# Patient Record
Sex: MALE | Race: BLACK OR AFRICAN AMERICAN | NOT HISPANIC OR LATINO | URBAN - METROPOLITAN AREA
[De-identification: names, ages, dates, MRNs, and addresses within clinical notes are randomized per-mention and may not be internally consistent; named-entity substitution may affect disease eponyms.]

---

## 2019-01-01 ENCOUNTER — HOSPITAL ENCOUNTER (INPATIENT)
Facility: OTHER | Age: 64
LOS: 4 days | DRG: 314 | End: 2019-08-01
Attending: HOSPITALIST | Admitting: HOSPITALIST
Payer: COMMERCIAL

## 2019-01-01 VITALS
TEMPERATURE: 94 F | WEIGHT: 253.5 LBS | BODY MASS INDEX: 33.6 KG/M2 | DIASTOLIC BLOOD PRESSURE: 34 MMHG | SYSTOLIC BLOOD PRESSURE: 72 MMHG | HEIGHT: 73 IN | OXYGEN SATURATION: 100 %

## 2019-01-01 DIAGNOSIS — D65: ICD-10-CM

## 2019-01-01 DIAGNOSIS — A41.9 SEPSIS: ICD-10-CM

## 2019-01-01 DIAGNOSIS — G93.1 ANOXIC ENCEPHALOPATHY: ICD-10-CM

## 2019-01-01 DIAGNOSIS — R57.9 SHOCK: ICD-10-CM

## 2019-01-01 DIAGNOSIS — J96.21 ACUTE ON CHRONIC RESPIRATORY FAILURE WITH HYPOXIA: ICD-10-CM

## 2019-01-01 DIAGNOSIS — N18.6 ESRD (END STAGE RENAL DISEASE): ICD-10-CM

## 2019-01-01 DIAGNOSIS — R57.9 SHOCK, UNSPECIFIED: ICD-10-CM

## 2019-01-01 DIAGNOSIS — R07.9 CHEST PAIN: ICD-10-CM

## 2019-01-01 DIAGNOSIS — I46.9 CARDIAC ARREST: ICD-10-CM

## 2019-01-01 DIAGNOSIS — D69.6 THROMBOCYTOPENIA: ICD-10-CM

## 2019-01-01 DIAGNOSIS — I50.23 ACUTE ON CHRONIC SYSTOLIC CONGESTIVE HEART FAILURE: ICD-10-CM

## 2019-01-01 DIAGNOSIS — R78.81 ENTEROCOCCAL BACTEREMIA: ICD-10-CM

## 2019-01-01 DIAGNOSIS — B95.2 ENTEROCOCCAL BACTEREMIA: ICD-10-CM

## 2019-01-01 DIAGNOSIS — A41.9 SEPSIS, DUE TO UNSPECIFIED ORGANISM: ICD-10-CM

## 2019-01-01 DIAGNOSIS — E44.1 MALNUTRITION OF MILD DEGREE: ICD-10-CM

## 2019-01-01 LAB
ABO + RH BLD: NORMAL
ACANTHOCYTES BLD QL SMEAR: PRESENT
ALBUMIN FLD-MCNC: 1.8 G/DL
ALBUMIN SERPL BCP-MCNC: 2.1 G/DL (ref 3.5–5.2)
ALBUMIN SERPL BCP-MCNC: 2.5 G/DL (ref 3.5–5.2)
ALBUMIN SERPL BCP-MCNC: 2.7 G/DL (ref 3.5–5.2)
ALBUMIN SERPL BCP-MCNC: 2.7 G/DL (ref 3.5–5.2)
ALBUMIN SERPL BCP-MCNC: 2.8 G/DL (ref 3.5–5.2)
ALBUMIN SERPL BCP-MCNC: 2.9 G/DL (ref 3.5–5.2)
ALBUMIN SERPL BCP-MCNC: 2.9 G/DL (ref 3.5–5.2)
ALBUMIN SERPL BCP-MCNC: 3 G/DL (ref 3.5–5.2)
ALLENS TEST: ABNORMAL
ALP SERPL-CCNC: 134 U/L (ref 55–135)
ALP SERPL-CCNC: 146 U/L (ref 55–135)
ALP SERPL-CCNC: 161 U/L (ref 55–135)
ALP SERPL-CCNC: 177 U/L (ref 55–135)
ALT SERPL W/O P-5'-P-CCNC: 1227 U/L (ref 10–44)
ALT SERPL W/O P-5'-P-CCNC: 1534 U/L (ref 10–44)
ALT SERPL W/O P-5'-P-CCNC: 1790 U/L (ref 10–44)
ALT SERPL W/O P-5'-P-CCNC: 1811 U/L (ref 10–44)
AMMONIA PLAS-SCNC: 141 UMOL/L (ref 10–50)
AMMONIA PLAS-SCNC: 144 UMOL/L (ref 10–50)
AMMONIA PLAS-SCNC: 158 UMOL/L (ref 10–50)
AMMONIA PLAS-SCNC: 167 UMOL/L (ref 10–50)
AMMONIA PLAS-SCNC: 169 UMOL/L (ref 10–50)
AMMONIA PLAS-SCNC: 172 UMOL/L (ref 10–50)
AMMONIA PLAS-SCNC: 174 UMOL/L (ref 10–50)
AMMONIA PLAS-SCNC: 204 UMOL/L (ref 10–50)
ANION GAP SERPL CALC-SCNC: 21 MMOL/L (ref 8–16)
ANION GAP SERPL CALC-SCNC: 22 MMOL/L (ref 8–16)
ANION GAP SERPL CALC-SCNC: 24 MMOL/L (ref 8–16)
ANION GAP SERPL CALC-SCNC: 25 MMOL/L (ref 8–16)
ANION GAP SERPL CALC-SCNC: 26 MMOL/L (ref 8–16)
ANION GAP SERPL CALC-SCNC: 28 MMOL/L (ref 8–16)
ANION GAP SERPL CALC-SCNC: 30 MMOL/L (ref 8–16)
ANION GAP SERPL CALC-SCNC: 31 MMOL/L (ref 8–16)
ANISOCYTOSIS BLD QL SMEAR: ABNORMAL
ANISOCYTOSIS BLD QL SMEAR: SLIGHT
AORTIC ROOT ANNULUS: 3.25 CM
AORTIC VALVE CUSP SEPERATION: 2.05 CM
APPEARANCE FLD: NORMAL
ASCENDING AORTA: 2.98 CM
AST SERPL-CCNC: 2168 U/L (ref 10–40)
AST SERPL-CCNC: 2902 U/L (ref 10–40)
AST SERPL-CCNC: 4122 U/L (ref 10–40)
AST SERPL-CCNC: 4507 U/L (ref 10–40)
AV INDEX (PROSTH): 1.31
AV MEAN GRADIENT: 4 MMHG
AV PEAK GRADIENT: 8 MMHG
AV VALVE AREA: 4.63 CM2
AV VELOCITY RATIO: 0.89
BACTERIA SPEC AEROBE CULT: NO GROWTH
BASOPHILS # BLD AUTO: 0.03 K/UL (ref 0–0.2)
BASOPHILS # BLD AUTO: ABNORMAL K/UL (ref 0–0.2)
BASOPHILS NFR BLD: 0 % (ref 0–1.9)
BASOPHILS NFR BLD: 0.2 % (ref 0–1.9)
BILIRUB SERPL-MCNC: 3.9 MG/DL (ref 0.1–1)
BILIRUB SERPL-MCNC: 5.4 MG/DL (ref 0.1–1)
BILIRUB SERPL-MCNC: 7.3 MG/DL (ref 0.1–1)
BILIRUB SERPL-MCNC: 8.7 MG/DL (ref 0.1–1)
BLD GP AB SCN CELLS X3 SERPL QL: NORMAL
BLD PROD TYP BPU: NORMAL
BLOOD UNIT EXPIRATION DATE: NORMAL
BLOOD UNIT TYPE CODE: 1700
BLOOD UNIT TYPE CODE: 5100
BLOOD UNIT TYPE CODE: 600
BLOOD UNIT TYPE CODE: 6200
BLOOD UNIT TYPE CODE: 6200
BLOOD UNIT TYPE CODE: 7300
BLOOD UNIT TYPE CODE: 8400
BLOOD UNIT TYPE: NORMAL
BNP SERPL-MCNC: >4900 PG/ML (ref 0–99)
BODY FLD TYPE: NORMAL
BODY FLUID SOURCE, LDH: NORMAL
BSA FOR ECHO PROCEDURE: 2.43 M2
BUN SERPL-MCNC: 11 MG/DL (ref 8–23)
BUN SERPL-MCNC: 12 MG/DL (ref 8–23)
BUN SERPL-MCNC: 14 MG/DL (ref 8–23)
BUN SERPL-MCNC: 14 MG/DL (ref 8–23)
BUN SERPL-MCNC: 16 MG/DL (ref 8–23)
BUN SERPL-MCNC: 18 MG/DL (ref 8–23)
BUN SERPL-MCNC: 20 MG/DL (ref 8–23)
BUN SERPL-MCNC: 20 MG/DL (ref 8–23)
BUN SERPL-MCNC: 21 MG/DL (ref 8–23)
BUN SERPL-MCNC: 24 MG/DL (ref 8–23)
BUN SERPL-MCNC: 28 MG/DL (ref 8–23)
BUN SERPL-MCNC: 28 MG/DL (ref 8–23)
BUN SERPL-MCNC: 29 MG/DL (ref 8–23)
BUN SERPL-MCNC: 30 MG/DL (ref 8–23)
BURR CELLS BLD QL SMEAR: ABNORMAL
CA-I BLDV-SCNC: 0.89 MMOL/L (ref 1.06–1.42)
CALCIUM SERPL-MCNC: 7.2 MG/DL (ref 8.7–10.5)
CALCIUM SERPL-MCNC: 7.6 MG/DL (ref 8.7–10.5)
CALCIUM SERPL-MCNC: 7.6 MG/DL (ref 8.7–10.5)
CALCIUM SERPL-MCNC: 7.8 MG/DL (ref 8.7–10.5)
CALCIUM SERPL-MCNC: 7.8 MG/DL (ref 8.7–10.5)
CALCIUM SERPL-MCNC: 7.9 MG/DL (ref 8.7–10.5)
CALCIUM SERPL-MCNC: 8.1 MG/DL (ref 8.7–10.5)
CALCIUM SERPL-MCNC: 8.1 MG/DL (ref 8.7–10.5)
CALCIUM SERPL-MCNC: 8.2 MG/DL (ref 8.7–10.5)
CHLORIDE SERPL-SCNC: 100 MMOL/L (ref 95–110)
CHLORIDE SERPL-SCNC: 100 MMOL/L (ref 95–110)
CHLORIDE SERPL-SCNC: 93 MMOL/L (ref 95–110)
CHLORIDE SERPL-SCNC: 94 MMOL/L (ref 95–110)
CHLORIDE SERPL-SCNC: 95 MMOL/L (ref 95–110)
CHLORIDE SERPL-SCNC: 95 MMOL/L (ref 95–110)
CHLORIDE SERPL-SCNC: 96 MMOL/L (ref 95–110)
CHLORIDE SERPL-SCNC: 97 MMOL/L (ref 95–110)
CHLORIDE SERPL-SCNC: 98 MMOL/L (ref 95–110)
CHLORIDE SERPL-SCNC: 99 MMOL/L (ref 95–110)
CO2 SERPL-SCNC: 11 MMOL/L (ref 23–29)
CO2 SERPL-SCNC: 12 MMOL/L (ref 23–29)
CO2 SERPL-SCNC: 13 MMOL/L (ref 23–29)
CO2 SERPL-SCNC: 14 MMOL/L (ref 23–29)
CO2 SERPL-SCNC: 14 MMOL/L (ref 23–29)
CO2 SERPL-SCNC: 15 MMOL/L (ref 23–29)
CO2 SERPL-SCNC: 17 MMOL/L (ref 23–29)
CO2 SERPL-SCNC: 7 MMOL/L (ref 23–29)
CO2 SERPL-SCNC: 8 MMOL/L (ref 23–29)
CO2 SERPL-SCNC: 9 MMOL/L (ref 23–29)
CO2 SERPL-SCNC: 9 MMOL/L (ref 23–29)
CODING SYSTEM: NORMAL
COLOR FLD: YELLOW
CREAT SERPL-MCNC: 1.5 MG/DL (ref 0.5–1.4)
CREAT SERPL-MCNC: 1.6 MG/DL (ref 0.5–1.4)
CREAT SERPL-MCNC: 1.8 MG/DL (ref 0.5–1.4)
CREAT SERPL-MCNC: 1.8 MG/DL (ref 0.5–1.4)
CREAT SERPL-MCNC: 2 MG/DL (ref 0.5–1.4)
CREAT SERPL-MCNC: 2.2 MG/DL (ref 0.5–1.4)
CREAT SERPL-MCNC: 2.3 MG/DL (ref 0.5–1.4)
CREAT SERPL-MCNC: 2.3 MG/DL (ref 0.5–1.4)
CREAT SERPL-MCNC: 2.5 MG/DL (ref 0.5–1.4)
CREAT SERPL-MCNC: 2.8 MG/DL (ref 0.5–1.4)
CREAT SERPL-MCNC: 3.4 MG/DL (ref 0.5–1.4)
CREAT SERPL-MCNC: 3.4 MG/DL (ref 0.5–1.4)
CREAT SERPL-MCNC: 3.6 MG/DL (ref 0.5–1.4)
CREAT SERPL-MCNC: 3.9 MG/DL (ref 0.5–1.4)
CV ECHO LV RWT: 0.8 CM
D DIMER PPP IA.FEU-MCNC: 33 MG/L FEU
DELSYS: ABNORMAL
DIFFERENTIAL METHOD: ABNORMAL
DISPENSE STATUS: NORMAL
DOP CALC AO PEAK VEL: 1.42 M/S
DOP CALC AO VTI: 12.86 CM
DOP CALC LVOT AREA: 3.5 CM2
DOP CALC LVOT DIAMETER: 2.12 CM
DOP CALC LVOT PEAK VEL: 1.27 M/S
DOP CALC LVOT STROKE VOLUME: 59.55 CM3
DOP CALCLVOT PEAK VEL VTI: 16.88 CM
E WAVE DECELERATION TIME: 155.44 MSEC
E/E' RATIO: 8.7 M/S
ECHO LV POSTERIOR WALL: 1.65 CM (ref 0.6–1.1)
EOSINOPHIL # BLD AUTO: 0.1 K/UL (ref 0–0.5)
EOSINOPHIL # BLD AUTO: ABNORMAL K/UL (ref 0–0.5)
EOSINOPHIL NFR BLD: 0 % (ref 0–8)
EOSINOPHIL NFR BLD: 0.6 % (ref 0–8)
EOSINOPHIL NFR BLD: 1 % (ref 0–8)
EOSINOPHIL NFR BLD: 1 % (ref 0–8)
ERYTHROCYTE [DISTWIDTH] IN BLOOD BY AUTOMATED COUNT: 21.6 % (ref 11.5–14.5)
ERYTHROCYTE [DISTWIDTH] IN BLOOD BY AUTOMATED COUNT: 21.8 % (ref 11.5–14.5)
ERYTHROCYTE [DISTWIDTH] IN BLOOD BY AUTOMATED COUNT: 21.8 % (ref 11.5–14.5)
ERYTHROCYTE [DISTWIDTH] IN BLOOD BY AUTOMATED COUNT: 21.9 % (ref 11.5–14.5)
ERYTHROCYTE [DISTWIDTH] IN BLOOD BY AUTOMATED COUNT: 22 % (ref 11.5–14.5)
ERYTHROCYTE [DISTWIDTH] IN BLOOD BY AUTOMATED COUNT: 22.3 % (ref 11.5–14.5)
ERYTHROCYTE [DISTWIDTH] IN BLOOD BY AUTOMATED COUNT: 22.3 % (ref 11.5–14.5)
ERYTHROCYTE [DISTWIDTH] IN BLOOD BY AUTOMATED COUNT: 22.5 % (ref 11.5–14.5)
ERYTHROCYTE [DISTWIDTH] IN BLOOD BY AUTOMATED COUNT: 22.6 % (ref 11.5–14.5)
ERYTHROCYTE [DISTWIDTH] IN BLOOD BY AUTOMATED COUNT: 22.7 % (ref 11.5–14.5)
ERYTHROCYTE [DISTWIDTH] IN BLOOD BY AUTOMATED COUNT: 22.9 % (ref 11.5–14.5)
ERYTHROCYTE [DISTWIDTH] IN BLOOD BY AUTOMATED COUNT: 22.9 % (ref 11.5–14.5)
ERYTHROCYTE [SEDIMENTATION RATE] IN BLOOD BY WESTERGREN METHOD: 20 MM/H
ERYTHROCYTE [SEDIMENTATION RATE] IN BLOOD BY WESTERGREN METHOD: 26 MM/H
EST. GFR  (AFRICAN AMERICAN): 18 ML/MIN/1.73 M^2
EST. GFR  (AFRICAN AMERICAN): 20 ML/MIN/1.73 M^2
EST. GFR  (AFRICAN AMERICAN): 21 ML/MIN/1.73 M^2
EST. GFR  (AFRICAN AMERICAN): 21 ML/MIN/1.73 M^2
EST. GFR  (AFRICAN AMERICAN): 27 ML/MIN/1.73 M^2
EST. GFR  (AFRICAN AMERICAN): 31 ML/MIN/1.73 M^2
EST. GFR  (AFRICAN AMERICAN): 34 ML/MIN/1.73 M^2
EST. GFR  (AFRICAN AMERICAN): 34 ML/MIN/1.73 M^2
EST. GFR  (AFRICAN AMERICAN): 36 ML/MIN/1.73 M^2
EST. GFR  (AFRICAN AMERICAN): 40 ML/MIN/1.73 M^2
EST. GFR  (AFRICAN AMERICAN): 46 ML/MIN/1.73 M^2
EST. GFR  (AFRICAN AMERICAN): 46 ML/MIN/1.73 M^2
EST. GFR  (AFRICAN AMERICAN): 53 ML/MIN/1.73 M^2
EST. GFR  (AFRICAN AMERICAN): 57 ML/MIN/1.73 M^2
EST. GFR  (NON AFRICAN AMERICAN): 15 ML/MIN/1.73 M^2
EST. GFR  (NON AFRICAN AMERICAN): 17 ML/MIN/1.73 M^2
EST. GFR  (NON AFRICAN AMERICAN): 18 ML/MIN/1.73 M^2
EST. GFR  (NON AFRICAN AMERICAN): 18 ML/MIN/1.73 M^2
EST. GFR  (NON AFRICAN AMERICAN): 23 ML/MIN/1.73 M^2
EST. GFR  (NON AFRICAN AMERICAN): 27 ML/MIN/1.73 M^2
EST. GFR  (NON AFRICAN AMERICAN): 29 ML/MIN/1.73 M^2
EST. GFR  (NON AFRICAN AMERICAN): 29 ML/MIN/1.73 M^2
EST. GFR  (NON AFRICAN AMERICAN): 31 ML/MIN/1.73 M^2
EST. GFR  (NON AFRICAN AMERICAN): 35 ML/MIN/1.73 M^2
EST. GFR  (NON AFRICAN AMERICAN): 39 ML/MIN/1.73 M^2
EST. GFR  (NON AFRICAN AMERICAN): 39 ML/MIN/1.73 M^2
EST. GFR  (NON AFRICAN AMERICAN): 45 ML/MIN/1.73 M^2
EST. GFR  (NON AFRICAN AMERICAN): 49 ML/MIN/1.73 M^2
ESTIMATED AVG GLUCOSE: 103 MG/DL (ref 68–131)
FIBRINOGEN PPP-MCNC: 103 MG/DL (ref 182–366)
FIBRINOGEN PPP-MCNC: 134 MG/DL (ref 182–366)
FIBRINOGEN PPP-MCNC: 75 MG/DL (ref 182–366)
FIBRINOGEN PPP-MCNC: <70 MG/DL (ref 182–366)
FIBRINOGEN PPP-MCNC: <70 MG/DL (ref 182–366)
FIO2: 35
FIO2: 40
FIO2: 60
FOLATE SERPL-MCNC: 7.6 NG/ML (ref 4–24)
FRACTIONAL SHORTENING: 23 % (ref 28–44)
GIANT PLATELETS BLD QL SMEAR: PRESENT
GLUCOSE SERPL-MCNC: 112 MG/DL (ref 70–110)
GLUCOSE SERPL-MCNC: 117 MG/DL (ref 70–110)
GLUCOSE SERPL-MCNC: 117 MG/DL (ref 70–110)
GLUCOSE SERPL-MCNC: 124 MG/DL (ref 70–110)
GLUCOSE SERPL-MCNC: 152 MG/DL (ref 70–110)
GLUCOSE SERPL-MCNC: 66 MG/DL (ref 70–110)
GLUCOSE SERPL-MCNC: 66 MG/DL (ref 70–110)
GLUCOSE SERPL-MCNC: 75 MG/DL (ref 70–110)
GLUCOSE SERPL-MCNC: 82 MG/DL (ref 70–110)
GLUCOSE SERPL-MCNC: 82 MG/DL (ref 70–110)
GLUCOSE SERPL-MCNC: 87 MG/DL (ref 70–110)
GLUCOSE SERPL-MCNC: 89 MG/DL (ref 70–110)
GLUCOSE SERPL-MCNC: 90 MG/DL (ref 70–110)
GLUCOSE SERPL-MCNC: 95 MG/DL (ref 70–110)
HAV IGM SERPL QL IA: NEGATIVE
HBA1C MFR BLD HPLC: 5.2 % (ref 4–5.6)
HBV CORE IGM SERPL QL IA: NEGATIVE
HBV SURFACE AG SERPL QL IA: NEGATIVE
HCO3 UR-SCNC: 10.5 MMOL/L (ref 24–28)
HCO3 UR-SCNC: 11.8 MMOL/L (ref 24–28)
HCO3 UR-SCNC: 12.8 MMOL/L (ref 24–28)
HCO3 UR-SCNC: 13.1 MMOL/L (ref 24–28)
HCO3 UR-SCNC: 13.5 MMOL/L (ref 24–28)
HCO3 UR-SCNC: 13.6 MMOL/L (ref 24–28)
HCO3 UR-SCNC: 13.8 MMOL/L (ref 24–28)
HCO3 UR-SCNC: 14.1 MMOL/L (ref 24–28)
HCO3 UR-SCNC: 14.5 MMOL/L (ref 24–28)
HCO3 UR-SCNC: 15.7 MMOL/L (ref 24–28)
HCT VFR BLD AUTO: 16.3 % (ref 40–54)
HCT VFR BLD AUTO: 21.5 % (ref 40–54)
HCT VFR BLD AUTO: 21.6 % (ref 40–54)
HCT VFR BLD AUTO: 21.9 % (ref 40–54)
HCT VFR BLD AUTO: 22.2 % (ref 40–54)
HCT VFR BLD AUTO: 22.4 % (ref 40–54)
HCT VFR BLD AUTO: 24.3 % (ref 40–54)
HCT VFR BLD AUTO: 24.4 % (ref 40–54)
HCT VFR BLD AUTO: 24.6 % (ref 40–54)
HCT VFR BLD AUTO: 26.6 % (ref 40–54)
HCT VFR BLD AUTO: 26.7 % (ref 40–54)
HCT VFR BLD AUTO: 26.9 % (ref 40–54)
HCV AB SERPL QL IA: NEGATIVE
HGB BLD-MCNC: 4.3 G/DL (ref 14–18)
HGB BLD-MCNC: 6.1 G/DL (ref 14–18)
HGB BLD-MCNC: 6.1 G/DL (ref 14–18)
HGB BLD-MCNC: 6.2 G/DL (ref 14–18)
HGB BLD-MCNC: 6.2 G/DL (ref 14–18)
HGB BLD-MCNC: 6.5 G/DL (ref 14–18)
HGB BLD-MCNC: 7 G/DL (ref 14–18)
HGB BLD-MCNC: 7.4 G/DL (ref 14–18)
HGB BLD-MCNC: 7.4 G/DL (ref 14–18)
HGB BLD-MCNC: 7.8 G/DL (ref 14–18)
HGB BLD-MCNC: 7.9 G/DL (ref 14–18)
HGB BLD-MCNC: 8.1 G/DL (ref 14–18)
HYPOCHROMIA BLD QL SMEAR: ABNORMAL
IMM GRANULOCYTES # BLD AUTO: 0.35 K/UL (ref 0–0.04)
IMM GRANULOCYTES # BLD AUTO: ABNORMAL K/UL (ref 0–0.04)
IMM GRANULOCYTES NFR BLD AUTO: 2.2 % (ref 0–0.5)
IMM GRANULOCYTES NFR BLD AUTO: ABNORMAL % (ref 0–0.5)
INR PPP: 3.1 (ref 0.8–1.2)
INR PPP: 3.4 (ref 0.8–1.2)
INR PPP: 3.5 (ref 0.8–1.2)
INR PPP: 3.8 (ref 0.8–1.2)
INR PPP: 3.9 (ref 0.8–1.2)
INR PPP: 4.3 (ref 0.8–1.2)
INTERVENTRICULAR SEPTUM: 1.7 CM (ref 0.6–1.1)
LACTATE SERPL-SCNC: 10.9 MMOL/L (ref 0.5–2.2)
LACTATE SERPL-SCNC: >12 MMOL/L (ref 0.5–2.2)
LDH FLD L TO P-CCNC: 636 U/L
LEFT ATRIUM SIZE: 3.71 CM
LEFT INTERNAL DIMENSION IN SYSTOLE: 3.19 CM (ref 2.1–4)
LEFT VENTRICLE DIASTOLIC VOLUME INDEX: 31.59 ML/M2
LEFT VENTRICLE DIASTOLIC VOLUME: 75.12 ML
LEFT VENTRICLE MASS INDEX: 122 G/M2
LEFT VENTRICLE SYSTOLIC VOLUME INDEX: 17.1 ML/M2
LEFT VENTRICLE SYSTOLIC VOLUME: 40.69 ML
LEFT VENTRICULAR INTERNAL DIMENSION IN DIASTOLE: 4.12 CM (ref 3.5–6)
LEFT VENTRICULAR MASS: 289.22 G
LV LATERAL E/E' RATIO: 7.14 M/S
LV SEPTAL E/E' RATIO: 11.11 M/S
LYMPHOCYTES # BLD AUTO: 0.6 K/UL (ref 1–4.8)
LYMPHOCYTES # BLD AUTO: ABNORMAL K/UL (ref 1–4.8)
LYMPHOCYTES NFR BLD: 0 % (ref 18–48)
LYMPHOCYTES NFR BLD: 10 % (ref 18–48)
LYMPHOCYTES NFR BLD: 13 % (ref 18–48)
LYMPHOCYTES NFR BLD: 2 % (ref 18–48)
LYMPHOCYTES NFR BLD: 28 % (ref 18–48)
LYMPHOCYTES NFR BLD: 3 % (ref 18–48)
LYMPHOCYTES NFR BLD: 3.5 % (ref 18–48)
LYMPHOCYTES NFR BLD: 4 % (ref 18–48)
LYMPHOCYTES NFR BLD: 5 % (ref 18–48)
LYMPHOCYTES NFR BLD: 5 % (ref 18–48)
LYMPHOCYTES NFR BLD: 8 % (ref 18–48)
LYMPHOCYTES NFR BLD: 9 % (ref 18–48)
LYMPHOCYTES NFR FLD MANUAL: 38 %
MAGNESIUM SERPL-MCNC: 1.6 MG/DL (ref 1.6–2.6)
MAGNESIUM SERPL-MCNC: 1.7 MG/DL (ref 1.6–2.6)
MAGNESIUM SERPL-MCNC: 1.9 MG/DL (ref 1.6–2.6)
MAGNESIUM SERPL-MCNC: 2 MG/DL (ref 1.6–2.6)
MAGNESIUM SERPL-MCNC: 2.2 MG/DL (ref 1.6–2.6)
MAGNESIUM SERPL-MCNC: 2.3 MG/DL (ref 1.6–2.6)
MAGNESIUM SERPL-MCNC: 2.3 MG/DL (ref 1.6–2.6)
MCH RBC QN AUTO: 30.9 PG (ref 27–31)
MCH RBC QN AUTO: 31 PG (ref 27–31)
MCH RBC QN AUTO: 31.7 PG (ref 27–31)
MCH RBC QN AUTO: 32 PG (ref 27–31)
MCH RBC QN AUTO: 32.6 PG (ref 27–31)
MCH RBC QN AUTO: 32.8 PG (ref 27–31)
MCH RBC QN AUTO: 32.9 PG (ref 27–31)
MCH RBC QN AUTO: 32.9 PG (ref 27–31)
MCH RBC QN AUTO: 33.1 PG (ref 27–31)
MCHC RBC AUTO-ENTMCNC: 26.4 G/DL (ref 32–36)
MCHC RBC AUTO-ENTMCNC: 27.7 G/DL (ref 32–36)
MCHC RBC AUTO-ENTMCNC: 27.9 G/DL (ref 32–36)
MCHC RBC AUTO-ENTMCNC: 28.2 G/DL (ref 32–36)
MCHC RBC AUTO-ENTMCNC: 28.8 G/DL (ref 32–36)
MCHC RBC AUTO-ENTMCNC: 28.8 G/DL (ref 32–36)
MCHC RBC AUTO-ENTMCNC: 29.3 G/DL (ref 32–36)
MCHC RBC AUTO-ENTMCNC: 29.3 G/DL (ref 32–36)
MCHC RBC AUTO-ENTMCNC: 29.6 G/DL (ref 32–36)
MCHC RBC AUTO-ENTMCNC: 30.1 G/DL (ref 32–36)
MCHC RBC AUTO-ENTMCNC: 30.1 G/DL (ref 32–36)
MCHC RBC AUTO-ENTMCNC: 30.3 G/DL (ref 32–36)
MCV RBC AUTO: 108 FL (ref 82–98)
MCV RBC AUTO: 108 FL (ref 82–98)
MCV RBC AUTO: 109 FL (ref 82–98)
MCV RBC AUTO: 110 FL (ref 82–98)
MCV RBC AUTO: 110 FL (ref 82–98)
MCV RBC AUTO: 111 FL (ref 82–98)
MCV RBC AUTO: 111 FL (ref 82–98)
MCV RBC AUTO: 113 FL (ref 82–98)
MCV RBC AUTO: 114 FL (ref 82–98)
MCV RBC AUTO: 116 FL (ref 82–98)
MCV RBC AUTO: 116 FL (ref 82–98)
MCV RBC AUTO: 117 FL (ref 82–98)
METAMYELOCYTES NFR BLD MANUAL: 1 %
METAMYELOCYTES NFR BLD MANUAL: 2 %
METAMYELOCYTES NFR BLD MANUAL: 3 %
METAMYELOCYTES NFR BLD MANUAL: 3 %
MIN VOL: 11.3
MIN VOL: 12.1
MIN VOL: 12.4
MIN VOL: 13.5
MIN VOL: 14
MIN VOL: 14
MODE: ABNORMAL
MONOCYTES # BLD AUTO: 0.6 K/UL (ref 0.3–1)
MONOCYTES # BLD AUTO: ABNORMAL K/UL (ref 0.3–1)
MONOCYTES NFR BLD: 1 % (ref 4–15)
MONOCYTES NFR BLD: 4 % (ref 4–15)
MONOCYTES NFR BLD: 5 % (ref 4–15)
MONOCYTES NFR BLD: 6 % (ref 4–15)
MONOCYTES NFR BLD: 7 % (ref 4–15)
MONOCYTES NFR BLD: 9 % (ref 4–15)
MONOCYTES NFR BLD: 9 % (ref 4–15)
MONOS+MACROS NFR FLD MANUAL: 24 %
MV PEAK E VEL: 1 M/S
MYELOCYTES NFR BLD MANUAL: 2 %
MYELOCYTES NFR BLD MANUAL: 2 %
NEUTROPHILS # BLD AUTO: 13.9 K/UL (ref 1.8–7.7)
NEUTROPHILS NFR BLD: 50 % (ref 38–73)
NEUTROPHILS NFR BLD: 54 % (ref 38–73)
NEUTROPHILS NFR BLD: 66 % (ref 38–73)
NEUTROPHILS NFR BLD: 67 % (ref 38–73)
NEUTROPHILS NFR BLD: 69 % (ref 38–73)
NEUTROPHILS NFR BLD: 70 % (ref 38–73)
NEUTROPHILS NFR BLD: 71 % (ref 38–73)
NEUTROPHILS NFR BLD: 73 % (ref 38–73)
NEUTROPHILS NFR BLD: 81 % (ref 38–73)
NEUTROPHILS NFR BLD: 84 % (ref 38–73)
NEUTROPHILS NFR BLD: 84 % (ref 38–73)
NEUTROPHILS NFR BLD: 89.5 % (ref 38–73)
NEUTROPHILS NFR FLD MANUAL: 38 %
NEUTS BAND NFR BLD MANUAL: 10 %
NEUTS BAND NFR BLD MANUAL: 15 %
NEUTS BAND NFR BLD MANUAL: 17 %
NEUTS BAND NFR BLD MANUAL: 19 %
NEUTS BAND NFR BLD MANUAL: 21 %
NEUTS BAND NFR BLD MANUAL: 23 %
NEUTS BAND NFR BLD MANUAL: 28 %
NEUTS BAND NFR BLD MANUAL: 6 %
NEUTS BAND NFR BLD MANUAL: 8 %
NEUTS BAND NFR BLD MANUAL: 9 %
NEUTS BAND NFR BLD MANUAL: 9 %
NRBC BLD-RTO: 10 /100 WBC
NRBC BLD-RTO: 10 /100 WBC
NRBC BLD-RTO: 11 /100 WBC
NRBC BLD-RTO: 11 /100 WBC
NRBC BLD-RTO: 13 /100 WBC
NRBC BLD-RTO: 17 /100 WBC
NRBC BLD-RTO: 17 /100 WBC
NRBC BLD-RTO: 22 /100 WBC
NRBC BLD-RTO: 22 /100 WBC
NRBC BLD-RTO: 6 /100 WBC
NRBC BLD-RTO: 7 /100 WBC
NRBC BLD-RTO: 8 /100 WBC
NUM UNITS TRANS PACKED RBC: NORMAL
NUM UNITS TRANS WBC-POOR PLATPHERESIS: NORMAL
OB PNL STL: POSITIVE
PCO2 BLDA: 30.4 MMHG (ref 35–45)
PCO2 BLDA: 32.2 MMHG (ref 35–45)
PCO2 BLDA: 33.8 MMHG (ref 35–45)
PCO2 BLDA: 33.9 MMHG (ref 35–45)
PCO2 BLDA: 34.2 MMHG (ref 35–45)
PCO2 BLDA: 35.3 MMHG (ref 35–45)
PCO2 BLDA: 36.2 MMHG (ref 35–45)
PCO2 BLDA: 36.8 MMHG (ref 35–45)
PCO2 BLDA: 37.9 MMHG (ref 35–45)
PCO2 BLDA: 40 MMHG (ref 35–45)
PEEP: 5
PH SMN: 7.14 [PH] (ref 7.35–7.45)
PH SMN: 7.15 [PH] (ref 7.35–7.45)
PH SMN: 7.16 [PH] (ref 7.35–7.45)
PH SMN: 7.17 [PH] (ref 7.35–7.45)
PH SMN: 7.18 [PH] (ref 7.35–7.45)
PH SMN: 7.18 [PH] (ref 7.35–7.45)
PH SMN: 7.21 [PH] (ref 7.35–7.45)
PH SMN: 7.22 [PH] (ref 7.35–7.45)
PH SMN: 7.22 [PH] (ref 7.35–7.45)
PH SMN: 7.24 [PH] (ref 7.35–7.45)
PHOSPHATE SERPL-MCNC: 3.8 MG/DL (ref 2.7–4.5)
PHOSPHATE SERPL-MCNC: 4 MG/DL (ref 2.7–4.5)
PHOSPHATE SERPL-MCNC: 4.1 MG/DL (ref 2.7–4.5)
PHOSPHATE SERPL-MCNC: 4.4 MG/DL (ref 2.7–4.5)
PHOSPHATE SERPL-MCNC: 4.7 MG/DL (ref 2.7–4.5)
PHOSPHATE SERPL-MCNC: 4.9 MG/DL (ref 2.7–4.5)
PHOSPHATE SERPL-MCNC: 5.5 MG/DL (ref 2.7–4.5)
PHOSPHATE SERPL-MCNC: 5.5 MG/DL (ref 2.7–4.5)
PHOSPHATE SERPL-MCNC: 5.8 MG/DL (ref 2.7–4.5)
PHOSPHATE SERPL-MCNC: 7.5 MG/DL (ref 2.7–4.5)
PIP: 17
PIP: 19
PIP: 22
PIP: 27
PIP: 28
PIP: 30
PISA TR MAX VEL: 2.99 M/S
PLATELET # BLD AUTO: 18 K/UL (ref 150–350)
PLATELET # BLD AUTO: 24 K/UL (ref 150–350)
PLATELET # BLD AUTO: 24 K/UL (ref 150–350)
PLATELET # BLD AUTO: 25 K/UL (ref 150–350)
PLATELET # BLD AUTO: 27 K/UL (ref 150–350)
PLATELET # BLD AUTO: 28 K/UL (ref 150–350)
PLATELET # BLD AUTO: 35 K/UL (ref 150–350)
PLATELET # BLD AUTO: 37 K/UL (ref 150–350)
PLATELET # BLD AUTO: 46 K/UL (ref 150–350)
PLATELET # BLD AUTO: 47 K/UL (ref 150–350)
PLATELET # BLD AUTO: 48 K/UL (ref 150–350)
PLATELET # BLD AUTO: 54 K/UL (ref 150–350)
PLATELET BLD QL SMEAR: ABNORMAL
PMV BLD AUTO: 10.3 FL (ref 9.2–12.9)
PMV BLD AUTO: 10.4 FL (ref 9.2–12.9)
PMV BLD AUTO: 10.6 FL (ref 9.2–12.9)
PMV BLD AUTO: 10.7 FL (ref 9.2–12.9)
PMV BLD AUTO: 11.2 FL (ref 9.2–12.9)
PMV BLD AUTO: 11.4 FL (ref 9.2–12.9)
PMV BLD AUTO: 9.5 FL (ref 9.2–12.9)
PMV BLD AUTO: 9.5 FL (ref 9.2–12.9)
PMV BLD AUTO: 9.6 FL (ref 9.2–12.9)
PMV BLD AUTO: 9.8 FL (ref 9.2–12.9)
PMV BLD AUTO: ABNORMAL FL (ref 9.2–12.9)
PMV BLD AUTO: ABNORMAL FL (ref 9.2–12.9)
PO2 BLDA: 100 MMHG (ref 80–100)
PO2 BLDA: 111 MMHG (ref 80–100)
PO2 BLDA: 116 MMHG (ref 80–100)
PO2 BLDA: 128 MMHG (ref 80–100)
PO2 BLDA: 261 MMHG (ref 80–100)
PO2 BLDA: 71 MMHG (ref 80–100)
PO2 BLDA: 74 MMHG (ref 80–100)
PO2 BLDA: 75 MMHG (ref 80–100)
PO2 BLDA: 95 MMHG (ref 80–100)
PO2 BLDA: 96 MMHG (ref 80–100)
POC BE: -12 MMOL/L
POC BE: -14 MMOL/L
POC BE: -15 MMOL/L
POC BE: -17 MMOL/L
POC BE: -18 MMOL/L
POC SATURATED O2: 100 % (ref 95–100)
POC SATURATED O2: 90 % (ref 95–100)
POC SATURATED O2: 91 % (ref 95–100)
POC SATURATED O2: 91 % (ref 95–100)
POC SATURATED O2: 95 % (ref 95–100)
POC SATURATED O2: 96 % (ref 95–100)
POC SATURATED O2: 96 % (ref 95–100)
POC SATURATED O2: 97 % (ref 95–100)
POC SATURATED O2: 97 % (ref 95–100)
POC SATURATED O2: 98 % (ref 95–100)
POCT GLUCOSE: 110 MG/DL (ref 70–110)
POCT GLUCOSE: 111 MG/DL (ref 70–110)
POCT GLUCOSE: 111 MG/DL (ref 70–110)
POCT GLUCOSE: 114 MG/DL (ref 70–110)
POCT GLUCOSE: 117 MG/DL (ref 70–110)
POCT GLUCOSE: 120 MG/DL (ref 70–110)
POCT GLUCOSE: 125 MG/DL (ref 70–110)
POCT GLUCOSE: 128 MG/DL (ref 70–110)
POCT GLUCOSE: 139 MG/DL (ref 70–110)
POCT GLUCOSE: 147 MG/DL (ref 70–110)
POCT GLUCOSE: 149 MG/DL (ref 70–110)
POCT GLUCOSE: 169 MG/DL (ref 70–110)
POCT GLUCOSE: 200 MG/DL (ref 70–110)
POCT GLUCOSE: 265 MG/DL (ref 70–110)
POCT GLUCOSE: 339 MG/DL (ref 70–110)
POCT GLUCOSE: 35 MG/DL (ref 70–110)
POCT GLUCOSE: 63 MG/DL (ref 70–110)
POCT GLUCOSE: 73 MG/DL (ref 70–110)
POCT GLUCOSE: 78 MG/DL (ref 70–110)
POCT GLUCOSE: 81 MG/DL (ref 70–110)
POCT GLUCOSE: 85 MG/DL (ref 70–110)
POCT GLUCOSE: 86 MG/DL (ref 70–110)
POCT GLUCOSE: 86 MG/DL (ref 70–110)
POCT GLUCOSE: 89 MG/DL (ref 70–110)
POCT GLUCOSE: 90 MG/DL (ref 70–110)
POCT GLUCOSE: 92 MG/DL (ref 70–110)
POCT GLUCOSE: 93 MG/DL (ref 70–110)
POCT GLUCOSE: 95 MG/DL (ref 70–110)
POCT GLUCOSE: 98 MG/DL (ref 70–110)
POCT GLUCOSE: <20 MG/DL (ref 70–110)
POIKILOCYTOSIS BLD QL SMEAR: ABNORMAL
POIKILOCYTOSIS BLD QL SMEAR: SLIGHT
POLYCHROMASIA BLD QL SMEAR: ABNORMAL
POTASSIUM SERPL-SCNC: 3.8 MMOL/L (ref 3.5–5.1)
POTASSIUM SERPL-SCNC: 3.9 MMOL/L (ref 3.5–5.1)
POTASSIUM SERPL-SCNC: 3.9 MMOL/L (ref 3.5–5.1)
POTASSIUM SERPL-SCNC: 4 MMOL/L (ref 3.5–5.1)
POTASSIUM SERPL-SCNC: 4.1 MMOL/L (ref 3.5–5.1)
POTASSIUM SERPL-SCNC: 4.2 MMOL/L (ref 3.5–5.1)
POTASSIUM SERPL-SCNC: 4.3 MMOL/L (ref 3.5–5.1)
POTASSIUM SERPL-SCNC: 4.3 MMOL/L (ref 3.5–5.1)
POTASSIUM SERPL-SCNC: 4.8 MMOL/L (ref 3.5–5.1)
PROT FLD-MCNC: 4.4 G/DL
PROT SERPL-MCNC: 7 G/DL (ref 6–8.4)
PROT SERPL-MCNC: 7.6 G/DL (ref 6–8.4)
PROT SERPL-MCNC: 7.6 G/DL (ref 6–8.4)
PROT SERPL-MCNC: 7.7 G/DL (ref 6–8.4)
PROTHROMBIN TIME: 34.6 SEC (ref 9–12.5)
PROTHROMBIN TIME: 38.1 SEC (ref 9–12.5)
PROTHROMBIN TIME: 38.2 SEC (ref 9–12.5)
PROTHROMBIN TIME: 42.3 SEC (ref 9–12.5)
PROTHROMBIN TIME: 43.4 SEC (ref 9–12.5)
PROTHROMBIN TIME: 47.4 SEC (ref 9–12.5)
PV PEAK VELOCITY: 0.77 CM/S
RBC # BLD AUTO: 1.39 M/UL (ref 4.6–6.2)
RBC # BLD AUTO: 1.87 M/UL (ref 4.6–6.2)
RBC # BLD AUTO: 1.89 M/UL (ref 4.6–6.2)
RBC # BLD AUTO: 1.94 M/UL (ref 4.6–6.2)
RBC # BLD AUTO: 1.97 M/UL (ref 4.6–6.2)
RBC # BLD AUTO: 2.05 M/UL (ref 4.6–6.2)
RBC # BLD AUTO: 2.15 M/UL (ref 4.6–6.2)
RBC # BLD AUTO: 2.25 M/UL (ref 4.6–6.2)
RBC # BLD AUTO: 2.25 M/UL (ref 4.6–6.2)
RBC # BLD AUTO: 2.39 M/UL (ref 4.6–6.2)
RBC # BLD AUTO: 2.42 M/UL (ref 4.6–6.2)
RBC # BLD AUTO: 2.45 M/UL (ref 4.6–6.2)
SAMPLE: ABNORMAL
SCHISTOCYTES BLD QL SMEAR: ABNORMAL
SCHISTOCYTES BLD QL SMEAR: PRESENT
SINUS: 2.97 CM
SITE: ABNORMAL
SMUDGE CELLS BLD QL SMEAR: PRESENT
SODIUM SERPL-SCNC: 132 MMOL/L (ref 136–145)
SODIUM SERPL-SCNC: 132 MMOL/L (ref 136–145)
SODIUM SERPL-SCNC: 133 MMOL/L (ref 136–145)
SODIUM SERPL-SCNC: 135 MMOL/L (ref 136–145)
SODIUM SERPL-SCNC: 139 MMOL/L (ref 136–145)
SP02: 80
SP02: 88
SP02: 92
SP02: 96
SP02: 98
SP02: 99
SPECIMEN SOURCE: NORMAL
SPECIMEN SOURCE: NORMAL
STJ: 2.62 CM
TDI LATERAL: 0.14 M/S
TDI SEPTAL: 0.09 M/S
TDI: 0.12 M/S
TOXIC GRANULES BLD QL SMEAR: PRESENT
TR MAX PG: 36 MMHG
TRANS ERYTHROCYTES VOL PATIENT: NORMAL ML
TRANS PLATPHERESIS VOL PATIENT: NORMAL ML
UNIT NUMBER: NORMAL
UNIT NUMBER: NORMAL
VANCOMYCIN SERPL-MCNC: 13.7 UG/ML
VIT B12 SERPL-MCNC: >2000 PG/ML (ref 210–950)
VT: 450
WBC # BLD AUTO: 11.15 K/UL (ref 3.9–12.7)
WBC # BLD AUTO: 12.89 K/UL (ref 3.9–12.7)
WBC # BLD AUTO: 14.41 K/UL (ref 3.9–12.7)
WBC # BLD AUTO: 14.64 K/UL (ref 3.9–12.7)
WBC # BLD AUTO: 14.89 K/UL (ref 3.9–12.7)
WBC # BLD AUTO: 15.52 K/UL (ref 3.9–12.7)
WBC # BLD AUTO: 15.56 K/UL (ref 3.9–12.7)
WBC # BLD AUTO: 18.73 K/UL (ref 3.9–12.7)
WBC # BLD AUTO: 8.83 K/UL (ref 3.9–12.7)
WBC # BLD AUTO: 8.99 K/UL (ref 3.9–12.7)
WBC # BLD AUTO: 9.07 K/UL (ref 3.9–12.7)
WBC # BLD AUTO: 9.4 K/UL (ref 3.9–12.7)
WBC # FLD: 123 /CU MM
WBC TOXIC VACUOLES BLD QL SMEAR: PRESENT
WBC TOXIC VACUOLES BLD QL SMEAR: PRESENT

## 2019-01-01 PROCEDURE — 82803 BLOOD GASES ANY COMBINATION: CPT | Mod: NTX

## 2019-01-01 PROCEDURE — 83735 ASSAY OF MAGNESIUM: CPT | Mod: NTX

## 2019-01-01 PROCEDURE — 85384 FIBRINOGEN ACTIVITY: CPT | Mod: NTX

## 2019-01-01 PROCEDURE — 63600175 PHARM REV CODE 636 W HCPCS: Mod: NTX | Performed by: INTERNAL MEDICINE

## 2019-01-01 PROCEDURE — 99291 CRITICAL CARE FIRST HOUR: CPT | Mod: NTX,,, | Performed by: HOSPITALIST

## 2019-01-01 PROCEDURE — 20000000 HC ICU ROOM: Mod: NTX

## 2019-01-01 PROCEDURE — 25000003 PHARM REV CODE 250: Mod: NTX | Performed by: HOSPITALIST

## 2019-01-01 PROCEDURE — 87070 CULTURE OTHR SPECIMN AEROBIC: CPT | Mod: NTX

## 2019-01-01 PROCEDURE — 80053 COMPREHEN METABOLIC PANEL: CPT | Mod: NTX

## 2019-01-01 PROCEDURE — 63600175 PHARM REV CODE 636 W HCPCS: Mod: NTX | Performed by: NURSE PRACTITIONER

## 2019-01-01 PROCEDURE — 90945 DIALYSIS ONE EVALUATION: CPT | Mod: NTX

## 2019-01-01 PROCEDURE — 85027 COMPLETE CBC AUTOMATED: CPT | Mod: 91,NTX

## 2019-01-01 PROCEDURE — 25000003 PHARM REV CODE 250: Mod: NTX | Performed by: INTERNAL MEDICINE

## 2019-01-01 PROCEDURE — 99233 PR SUBSEQUENT HOSPITAL CARE,LEVL III: ICD-10-PCS | Mod: NTX,,, | Performed by: HOSPITALIST

## 2019-01-01 PROCEDURE — 83735 ASSAY OF MAGNESIUM: CPT | Mod: 91,NTX

## 2019-01-01 PROCEDURE — 82042 OTHER SOURCE ALBUMIN QUAN EA: CPT | Mod: NTX

## 2019-01-01 PROCEDURE — 86965 POOLING BLOOD PLATELETS: CPT | Mod: NTX

## 2019-01-01 PROCEDURE — 99900035 HC TECH TIME PER 15 MIN (STAT): Mod: NTX

## 2019-01-01 PROCEDURE — 86920 COMPATIBILITY TEST SPIN: CPT | Mod: NTX

## 2019-01-01 PROCEDURE — 63600175 PHARM REV CODE 636 W HCPCS: Mod: NTX | Performed by: HOSPITALIST

## 2019-01-01 PROCEDURE — 99291 PR CRITICAL CARE, E/M 30-74 MINUTES: ICD-10-PCS | Mod: NTX,,, | Performed by: HOSPITALIST

## 2019-01-01 PROCEDURE — 99239 PR HOSPITAL DISCHARGE DAY,>30 MIN: ICD-10-PCS | Mod: NTX,,, | Performed by: HOSPITALIST

## 2019-01-01 PROCEDURE — 83605 ASSAY OF LACTIC ACID: CPT | Mod: NTX

## 2019-01-01 PROCEDURE — 27000221 HC OXYGEN, UP TO 24 HOURS: Mod: NTX

## 2019-01-01 PROCEDURE — 83605 ASSAY OF LACTIC ACID: CPT | Mod: 91,NTX

## 2019-01-01 PROCEDURE — 25000003 PHARM REV CODE 250: Mod: NTX | Performed by: NURSE PRACTITIONER

## 2019-01-01 PROCEDURE — 82140 ASSAY OF AMMONIA: CPT | Mod: NTX

## 2019-01-01 PROCEDURE — 85610 PROTHROMBIN TIME: CPT | Mod: NTX

## 2019-01-01 PROCEDURE — 27200188 HC TRANSDUCER, ART ADULT/PEDS: Mod: NTX

## 2019-01-01 PROCEDURE — 85379 FIBRIN DEGRADATION QUANT: CPT | Mod: NTX

## 2019-01-01 PROCEDURE — 37799 UNLISTED PX VASCULAR SURGERY: CPT | Mod: NTX

## 2019-01-01 PROCEDURE — 95822 EEG COMA OR SLEEP ONLY: CPT | Mod: NTX

## 2019-01-01 PROCEDURE — 85384 FIBRINOGEN ACTIVITY: CPT | Mod: 91,NTX

## 2019-01-01 PROCEDURE — 36430 TRANSFUSION BLD/BLD COMPNT: CPT

## 2019-01-01 PROCEDURE — 80100008 HC CRRT DAILY MAINTENANCE: Mod: NTX

## 2019-01-01 PROCEDURE — 99900026 HC AIRWAY MAINTENANCE (STAT): Mod: NTX

## 2019-01-01 PROCEDURE — 80069 RENAL FUNCTION PANEL: CPT | Mod: 91,NTX

## 2019-01-01 PROCEDURE — C9113 INJ PANTOPRAZOLE SODIUM, VIA: HCPCS | Mod: NTX | Performed by: HOSPITALIST

## 2019-01-01 PROCEDURE — 80069 RENAL FUNCTION PANEL: CPT | Mod: NTX

## 2019-01-01 PROCEDURE — 99223 PR INITIAL HOSPITAL CARE,LEVL III: ICD-10-PCS | Mod: NTX,,, | Performed by: INTERNAL MEDICINE

## 2019-01-01 PROCEDURE — P9016 RBC LEUKOCYTES REDUCED: HCPCS | Mod: NTX

## 2019-01-01 PROCEDURE — P9035 PLATELET PHERES LEUKOREDUCED: HCPCS | Mod: NTX

## 2019-01-01 PROCEDURE — 87075 CULTR BACTERIA EXCEPT BLOOD: CPT | Mod: NTX

## 2019-01-01 PROCEDURE — 84100 ASSAY OF PHOSPHORUS: CPT | Mod: NTX

## 2019-01-01 PROCEDURE — 83880 ASSAY OF NATRIURETIC PEPTIDE: CPT | Mod: NTX

## 2019-01-01 PROCEDURE — 85007 BL SMEAR W/DIFF WBC COUNT: CPT | Mod: 91,NTX

## 2019-01-01 PROCEDURE — 87040 BLOOD CULTURE FOR BACTERIA: CPT | Mod: NTX

## 2019-01-01 PROCEDURE — P9021 RED BLOOD CELLS UNIT: HCPCS | Mod: NTX

## 2019-01-01 PROCEDURE — 83615 LACTATE (LD) (LDH) ENZYME: CPT | Mod: NTX

## 2019-01-01 PROCEDURE — 94003 VENT MGMT INPAT SUBQ DAY: CPT | Mod: NTX

## 2019-01-01 PROCEDURE — 99231 SBSQ HOSP IP/OBS SF/LOW 25: CPT | Mod: NTX,,, | Performed by: INTERNAL MEDICINE

## 2019-01-01 PROCEDURE — 99233 SBSQ HOSP IP/OBS HIGH 50: CPT | Mod: NTX,,, | Performed by: HOSPITALIST

## 2019-01-01 PROCEDURE — 93005 ELECTROCARDIOGRAM TRACING: CPT | Mod: NTX

## 2019-01-01 PROCEDURE — 82272 OCCULT BLD FECES 1-3 TESTS: CPT | Mod: NTX

## 2019-01-01 PROCEDURE — 94761 N-INVAS EAR/PLS OXIMETRY MLT: CPT | Mod: NTX

## 2019-01-01 PROCEDURE — 36415 COLL VENOUS BLD VENIPUNCTURE: CPT | Mod: NTX

## 2019-01-01 PROCEDURE — 84157 ASSAY OF PROTEIN OTHER: CPT | Mod: NTX

## 2019-01-01 PROCEDURE — 27202415 HC CARTRIDGE, CRRT: Mod: NTX

## 2019-01-01 PROCEDURE — 99291 CRITICAL CARE FIRST HOUR: CPT | Mod: NTX,,, | Performed by: INTERNAL MEDICINE

## 2019-01-01 PROCEDURE — P9037 PLATE PHERES LEUKOREDU IRRAD: HCPCS | Mod: NTX

## 2019-01-01 PROCEDURE — P9012 CRYOPRECIPITATE EACH UNIT: HCPCS | Mod: NTX

## 2019-01-01 PROCEDURE — 99233 SBSQ HOSP IP/OBS HIGH 50: CPT | Mod: NTX,,, | Performed by: INTERNAL MEDICINE

## 2019-01-01 PROCEDURE — 86901 BLOOD TYPING SEROLOGIC RH(D): CPT | Mod: NTX

## 2019-01-01 PROCEDURE — 82746 ASSAY OF FOLIC ACID SERUM: CPT | Mod: NTX

## 2019-01-01 PROCEDURE — 82607 VITAMIN B-12: CPT | Mod: NTX

## 2019-01-01 PROCEDURE — 80074 ACUTE HEPATITIS PANEL: CPT | Mod: NTX

## 2019-01-01 PROCEDURE — 85610 PROTHROMBIN TIME: CPT | Mod: 91,NTX

## 2019-01-01 PROCEDURE — 93010 ELECTROCARDIOGRAM REPORT: CPT | Mod: NTX,,, | Performed by: INTERNAL MEDICINE

## 2019-01-01 PROCEDURE — 99223 1ST HOSP IP/OBS HIGH 75: CPT | Mod: NTX,,, | Performed by: INTERNAL MEDICINE

## 2019-01-01 PROCEDURE — 82330 ASSAY OF CALCIUM: CPT | Mod: NTX

## 2019-01-01 PROCEDURE — 85025 COMPLETE CBC W/AUTO DIFF WBC: CPT | Mod: NTX

## 2019-01-01 PROCEDURE — 63600175 PHARM REV CODE 636 W HCPCS: Mod: NTX | Performed by: PHYSICIAN ASSISTANT

## 2019-01-01 PROCEDURE — 85027 COMPLETE CBC AUTOMATED: CPT | Mod: NTX

## 2019-01-01 PROCEDURE — 25000003 PHARM REV CODE 250: Mod: NTX | Performed by: STUDENT IN AN ORGANIZED HEALTH CARE EDUCATION/TRAINING PROGRAM

## 2019-01-01 PROCEDURE — 86022 PLATELET ANTIBODIES: CPT | Mod: NTX

## 2019-01-01 PROCEDURE — 93010 EKG 12-LEAD: ICD-10-PCS | Mod: NTX,,, | Performed by: INTERNAL MEDICINE

## 2019-01-01 PROCEDURE — 80202 ASSAY OF VANCOMYCIN: CPT | Mod: NTX

## 2019-01-01 PROCEDURE — 99231 PR SUBSEQUENT HOSPITAL CARE,LEVL I: ICD-10-PCS | Mod: NTX,,, | Performed by: INTERNAL MEDICINE

## 2019-01-01 PROCEDURE — 82140 ASSAY OF AMMONIA: CPT | Mod: 91,NTX

## 2019-01-01 PROCEDURE — 99291 PR CRITICAL CARE, E/M 30-74 MINUTES: ICD-10-PCS | Mod: NTX,,, | Performed by: INTERNAL MEDICINE

## 2019-01-01 PROCEDURE — 31720 CLEARANCE OF AIRWAYS: CPT | Mod: NTX

## 2019-01-01 PROCEDURE — 99233 PR SUBSEQUENT HOSPITAL CARE,LEVL III: ICD-10-PCS | Mod: NTX,,, | Performed by: INTERNAL MEDICINE

## 2019-01-01 PROCEDURE — 94002 VENT MGMT INPAT INIT DAY: CPT | Mod: NTX

## 2019-01-01 PROCEDURE — 97802 MEDICAL NUTRITION INDIV IN: CPT | Mod: NTX

## 2019-01-01 PROCEDURE — 83036 HEMOGLOBIN GLYCOSYLATED A1C: CPT | Mod: NTX

## 2019-01-01 PROCEDURE — 99239 HOSP IP/OBS DSCHRG MGMT >30: CPT | Mod: NTX,,, | Performed by: HOSPITALIST

## 2019-01-01 PROCEDURE — 95822 PR EEG,COMA/SLEEP RECORD ONLY: ICD-10-PCS | Mod: 26,NTX,, | Performed by: PSYCHIATRY & NEUROLOGY

## 2019-01-01 PROCEDURE — 95822 EEG COMA OR SLEEP ONLY: CPT | Mod: 26,NTX,, | Performed by: PSYCHIATRY & NEUROLOGY

## 2019-01-01 PROCEDURE — 85007 BL SMEAR W/DIFF WBC COUNT: CPT | Mod: NTX

## 2019-01-01 PROCEDURE — 89051 BODY FLUID CELL COUNT: CPT | Mod: NTX

## 2019-01-01 RX ORDER — MAGNESIUM SULFATE HEPTAHYDRATE 40 MG/ML
2 INJECTION, SOLUTION INTRAVENOUS
Status: DISCONTINUED | OUTPATIENT
Start: 2019-01-01 | End: 2019-01-01

## 2019-01-01 RX ORDER — HYDROCODONE BITARTRATE AND ACETAMINOPHEN 500; 5 MG/1; MG/1
TABLET ORAL
Status: DISCONTINUED | OUTPATIENT
Start: 2019-01-01 | End: 2019-01-01

## 2019-01-01 RX ORDER — HYDROCODONE BITARTRATE AND ACETAMINOPHEN 500; 5 MG/1; MG/1
TABLET ORAL
Status: DISCONTINUED | OUTPATIENT
Start: 2019-01-01 | End: 2019-01-01 | Stop reason: HOSPADM

## 2019-01-01 RX ORDER — LACTULOSE 10 G/15ML
15 SOLUTION ORAL ONCE
Status: COMPLETED | OUTPATIENT
Start: 2019-01-01 | End: 2019-01-01

## 2019-01-01 RX ORDER — HEPARIN SODIUM 5000 [USP'U]/ML
5000 INJECTION, SOLUTION INTRAVENOUS; SUBCUTANEOUS ONCE
Status: COMPLETED | OUTPATIENT
Start: 2019-01-01 | End: 2019-01-01

## 2019-01-01 RX ORDER — DEXMEDETOMIDINE HYDROCHLORIDE 4 UG/ML
0.2 INJECTION, SOLUTION INTRAVENOUS CONTINUOUS
Status: DISCONTINUED | OUTPATIENT
Start: 2019-01-01 | End: 2019-01-01

## 2019-01-01 RX ORDER — INSULIN ASPART 100 [IU]/ML
0-5 INJECTION, SOLUTION INTRAVENOUS; SUBCUTANEOUS EVERY 4 HOURS PRN
Status: DISCONTINUED | OUTPATIENT
Start: 2019-01-01 | End: 2019-01-01

## 2019-01-01 RX ORDER — GLUCAGON 1 MG
1 KIT INJECTION
Status: DISCONTINUED | OUTPATIENT
Start: 2019-01-01 | End: 2019-01-01 | Stop reason: SDUPTHER

## 2019-01-01 RX ORDER — ONDANSETRON 2 MG/ML
4 INJECTION INTRAMUSCULAR; INTRAVENOUS EVERY 8 HOURS PRN
Status: DISCONTINUED | OUTPATIENT
Start: 2019-01-01 | End: 2019-01-01 | Stop reason: HOSPADM

## 2019-01-01 RX ORDER — SODIUM CHLORIDE 0.9 % (FLUSH) 0.9 %
10 SYRINGE (ML) INJECTION
Status: DISCONTINUED | OUTPATIENT
Start: 2019-01-01 | End: 2019-01-01 | Stop reason: HOSPADM

## 2019-01-01 RX ORDER — IBUPROFEN 200 MG
16 TABLET ORAL
Status: DISCONTINUED | OUTPATIENT
Start: 2019-01-01 | End: 2019-01-01 | Stop reason: HOSPADM

## 2019-01-01 RX ORDER — CEFEPIME HYDROCHLORIDE 1 G/50ML
1 INJECTION, SOLUTION INTRAVENOUS
Status: DISCONTINUED | OUTPATIENT
Start: 2019-01-01 | End: 2019-01-01

## 2019-01-01 RX ORDER — IBUPROFEN 200 MG
24 TABLET ORAL
Status: DISCONTINUED | OUTPATIENT
Start: 2019-01-01 | End: 2019-01-01 | Stop reason: HOSPADM

## 2019-01-01 RX ORDER — PANTOPRAZOLE SODIUM 40 MG/10ML
40 INJECTION, POWDER, LYOPHILIZED, FOR SOLUTION INTRAVENOUS 2 TIMES DAILY
Status: DISCONTINUED | OUTPATIENT
Start: 2019-01-01 | End: 2019-01-01

## 2019-01-01 RX ORDER — MAGNESIUM SULFATE HEPTAHYDRATE 40 MG/ML
2 INJECTION, SOLUTION INTRAVENOUS
Status: DISCONTINUED | OUTPATIENT
Start: 2019-01-01 | End: 2019-01-01 | Stop reason: HOSPADM

## 2019-01-01 RX ORDER — PARICALCITOL 5 UG/ML
2 INJECTION, SOLUTION INTRAVENOUS
Status: DISCONTINUED | OUTPATIENT
Start: 2019-01-01 | End: 2019-01-01 | Stop reason: HOSPADM

## 2019-01-01 RX ORDER — GLUCAGON 1 MG
1 KIT INJECTION
Status: DISCONTINUED | OUTPATIENT
Start: 2019-01-01 | End: 2019-01-01

## 2019-01-01 RX ORDER — LACTULOSE 10 G/15ML
20 SOLUTION ORAL 3 TIMES DAILY
Status: DISCONTINUED | OUTPATIENT
Start: 2019-01-01 | End: 2019-01-01 | Stop reason: HOSPADM

## 2019-01-01 RX ADMIN — LACTULOSE 20 G: 10 SOLUTION ORAL at 08:07

## 2019-01-01 RX ADMIN — PANTOPRAZOLE SODIUM 40 MG: 40 INJECTION, POWDER, FOR SOLUTION INTRAVENOUS at 09:07

## 2019-01-01 RX ADMIN — EPINEPHRINE 2 MCG/KG/MIN: 1 INJECTION PARENTERAL at 03:08

## 2019-01-01 RX ADMIN — SODIUM CHLORIDE 500 ML: 0.9 INJECTION, SOLUTION INTRAVENOUS at 10:07

## 2019-01-01 RX ADMIN — EPINEPHRINE 0.18 MCG/KG/MIN: 1 INJECTION INTRAMUSCULAR; INTRAVENOUS; SUBCUTANEOUS at 07:07

## 2019-01-01 RX ADMIN — SODIUM BICARBONATE: 84 INJECTION, SOLUTION INTRAVENOUS at 09:07

## 2019-01-01 RX ADMIN — NOREPINEPHRINE BITARTRATE 0.9 MCG/KG/MIN: 1 INJECTION, SOLUTION, CONCENTRATE INTRAVENOUS at 04:07

## 2019-01-01 RX ADMIN — VASOPRESSIN 0.04 UNITS/MIN: 20 INJECTION INTRAVENOUS at 11:08

## 2019-01-01 RX ADMIN — AMPICILLIN SODIUM 2 G: 2 INJECTION, POWDER, FOR SOLUTION INTRAMUSCULAR; INTRAVENOUS at 07:07

## 2019-01-01 RX ADMIN — EPINEPHRINE 2 MCG/KG/MIN: 1 INJECTION PARENTERAL at 07:08

## 2019-01-01 RX ADMIN — SODIUM BICARBONATE: 84 INJECTION, SOLUTION INTRAVENOUS at 04:07

## 2019-01-01 RX ADMIN — NOREPINEPHRINE BITARTRATE 1.2 MCG/KG/MIN: 1 INJECTION INTRAVENOUS at 12:07

## 2019-01-01 RX ADMIN — AMPICILLIN SODIUM 2 G: 2 INJECTION, POWDER, FOR SOLUTION INTRAMUSCULAR; INTRAVENOUS at 01:07

## 2019-01-01 RX ADMIN — EPINEPHRINE 2 MCG/KG/MIN: 1 INJECTION PARENTERAL at 10:08

## 2019-01-01 RX ADMIN — HYDROCORTISONE SODIUM SUCCINATE 100 MG: 100 INJECTION, POWDER, FOR SOLUTION INTRAMUSCULAR; INTRAVENOUS at 01:07

## 2019-01-01 RX ADMIN — NOREPINEPHRINE BITARTRATE 3 MCG/KG/MIN: 1 INJECTION INTRAVENOUS at 02:08

## 2019-01-01 RX ADMIN — SODIUM BICARBONATE: 84 INJECTION, SOLUTION INTRAVENOUS at 12:07

## 2019-01-01 RX ADMIN — VASOPRESSIN 0.04 UNITS/MIN: 20 INJECTION INTRAVENOUS at 03:07

## 2019-01-01 RX ADMIN — PANTOPRAZOLE SODIUM 40 MG: 40 INJECTION, POWDER, FOR SOLUTION INTRAVENOUS at 08:07

## 2019-01-01 RX ADMIN — SODIUM BICARBONATE: 84 INJECTION, SOLUTION INTRAVENOUS at 05:07

## 2019-01-01 RX ADMIN — SODIUM BICARBONATE: 84 INJECTION, SOLUTION INTRAVENOUS at 10:08

## 2019-01-01 RX ADMIN — NOREPINEPHRINE BITARTRATE 3 MCG/KG/MIN: 1 INJECTION INTRAVENOUS at 05:07

## 2019-01-01 RX ADMIN — LACTULOSE 15 G: 20 SOLUTION ORAL at 05:07

## 2019-01-01 RX ADMIN — PARICALCITOL 2 MCG: 5 INJECTION, SOLUTION INTRAVENOUS at 09:07

## 2019-01-01 RX ADMIN — NOREPINEPHRINE BITARTRATE 0.9 MCG/KG/MIN: 1 INJECTION, SOLUTION, CONCENTRATE INTRAVENOUS at 08:07

## 2019-01-01 RX ADMIN — CEFEPIME HYDROCHLORIDE 1 G: 1 INJECTION, SOLUTION INTRAVENOUS at 01:07

## 2019-01-01 RX ADMIN — DEXTROSE MONOHYDRATE 25 G: 25 INJECTION, SOLUTION INTRAVENOUS at 07:07

## 2019-01-01 RX ADMIN — VANCOMYCIN HYDROCHLORIDE 2250 MG: 100 INJECTION, POWDER, LYOPHILIZED, FOR SOLUTION INTRAVENOUS at 03:07

## 2019-01-01 RX ADMIN — NOREPINEPHRINE BITARTRATE 3 MCG/KG/MIN: 1 INJECTION INTRAVENOUS at 07:07

## 2019-01-01 RX ADMIN — LACTULOSE 20 G: 10 SOLUTION ORAL at 09:07

## 2019-01-01 RX ADMIN — HYDROCORTISONE SODIUM SUCCINATE 100 MG: 100 INJECTION, POWDER, FOR SOLUTION INTRAMUSCULAR; INTRAVENOUS at 09:07

## 2019-01-01 RX ADMIN — NOREPINEPHRINE BITARTRATE 3 MCG/KG/MIN: 1 INJECTION INTRAVENOUS at 09:08

## 2019-01-01 RX ADMIN — EPINEPHRINE 2 MCG/KG/MIN: 1 INJECTION PARENTERAL at 08:08

## 2019-01-01 RX ADMIN — INSULIN ASPART 2 UNITS: 100 INJECTION, SOLUTION INTRAVENOUS; SUBCUTANEOUS at 11:08

## 2019-01-01 RX ADMIN — AMPICILLIN SODIUM 2 G: 2 INJECTION, POWDER, FOR SOLUTION INTRAMUSCULAR; INTRAVENOUS at 08:07

## 2019-01-01 RX ADMIN — SODIUM BICARBONATE: 84 INJECTION, SOLUTION INTRAVENOUS at 06:07

## 2019-01-01 RX ADMIN — DEXTROSE MONOHYDRATE 12.5 G: 25 INJECTION, SOLUTION INTRAVENOUS at 04:07

## 2019-01-01 RX ADMIN — HYDROCORTISONE SODIUM SUCCINATE 100 MG: 100 INJECTION, POWDER, FOR SOLUTION INTRAMUSCULAR; INTRAVENOUS at 05:08

## 2019-01-01 RX ADMIN — NOREPINEPHRINE BITARTRATE 1 MCG/KG/MIN: 1 INJECTION INTRAVENOUS at 11:07

## 2019-01-01 RX ADMIN — NOREPINEPHRINE BITARTRATE 2 MCG/KG/MIN: 1 INJECTION INTRAVENOUS at 05:07

## 2019-01-01 RX ADMIN — VASOPRESSIN 0.04 UNITS/MIN: 20 INJECTION INTRAVENOUS at 07:07

## 2019-01-01 RX ADMIN — NOREPINEPHRINE BITARTRATE 3 MCG/KG/MIN: 1 INJECTION INTRAVENOUS at 09:07

## 2019-01-01 RX ADMIN — EPINEPHRINE 2 MCG/KG/MIN: 1 INJECTION PARENTERAL at 09:08

## 2019-01-01 RX ADMIN — EPINEPHRINE 2 MCG/KG/MIN: 1 INJECTION PARENTERAL at 11:08

## 2019-01-01 RX ADMIN — LACTULOSE 20 G: 10 SOLUTION ORAL at 03:07

## 2019-01-01 RX ADMIN — CALCIUM GLUCONATE 2 G: 98 INJECTION, SOLUTION INTRAVENOUS at 07:07

## 2019-01-01 RX ADMIN — SODIUM BICARBONATE: 84 INJECTION, SOLUTION INTRAVENOUS at 08:07

## 2019-01-01 RX ADMIN — MAGNESIUM SULFATE IN WATER 2 G: 40 INJECTION, SOLUTION INTRAVENOUS at 05:07

## 2019-01-01 RX ADMIN — SODIUM BICARBONATE: 84 INJECTION, SOLUTION INTRAVENOUS at 07:08

## 2019-01-01 RX ADMIN — SODIUM BICARBONATE: 84 INJECTION, SOLUTION INTRAVENOUS at 03:07

## 2019-01-01 RX ADMIN — NOREPINEPHRINE BITARTRATE 1.35 MCG/KG/MIN: 1 INJECTION INTRAVENOUS at 07:07

## 2019-01-01 RX ADMIN — HYDROCORTISONE SODIUM SUCCINATE 100 MG: 100 INJECTION, POWDER, FOR SOLUTION INTRAMUSCULAR; INTRAVENOUS at 02:07

## 2019-01-01 RX ADMIN — VASOPRESSIN 0.04 UNITS/MIN: 20 INJECTION INTRAVENOUS at 05:08

## 2019-01-01 RX ADMIN — LACTULOSE 20 G: 10 SOLUTION ORAL at 10:07

## 2019-01-01 RX ADMIN — MAGNESIUM SULFATE IN WATER 2 G: 40 INJECTION, SOLUTION INTRAVENOUS at 04:07

## 2019-01-01 RX ADMIN — DEXTROSE 8 MG/HR: 50 INJECTION, SOLUTION INTRAVENOUS at 06:08

## 2019-01-01 RX ADMIN — VASOPRESSIN 0.04 UNITS/MIN: 20 INJECTION INTRAVENOUS at 06:07

## 2019-01-01 RX ADMIN — EPINEPHRINE 2 MCG/KG/MIN: 1 INJECTION PARENTERAL at 02:08

## 2019-01-01 RX ADMIN — AMPICILLIN SODIUM 2 G: 2 INJECTION, POWDER, FOR SOLUTION INTRAMUSCULAR; INTRAVENOUS at 08:08

## 2019-01-01 RX ADMIN — EPINEPHRINE 2 MCG/KG/MIN: 1 INJECTION INTRAMUSCULAR; INTRAVENOUS; SUBCUTANEOUS at 02:08

## 2019-01-01 RX ADMIN — HYDROCORTISONE SODIUM SUCCINATE 100 MG: 100 INJECTION, POWDER, FOR SOLUTION INTRAMUSCULAR; INTRAVENOUS at 05:07

## 2019-01-01 RX ADMIN — NOREPINEPHRINE BITARTRATE 0.98 MCG/KG/MIN: 1 INJECTION INTRAVENOUS at 04:07

## 2019-01-01 RX ADMIN — SODIUM BICARBONATE: 84 INJECTION, SOLUTION INTRAVENOUS at 11:07

## 2019-01-01 RX ADMIN — NOREPINEPHRINE BITARTRATE 1.14 MCG/KG/MIN: 1 INJECTION INTRAVENOUS at 08:07

## 2019-01-01 RX ADMIN — PANTOPRAZOLE SODIUM 8 MG/HR: 40 INJECTION, POWDER, FOR SOLUTION INTRAVENOUS at 10:08

## 2019-01-01 RX ADMIN — VANCOMYCIN HYDROCHLORIDE 500 MG: 500 INJECTION, POWDER, LYOPHILIZED, FOR SOLUTION INTRAVENOUS at 07:07

## 2019-01-01 RX ADMIN — NOREPINEPHRINE BITARTRATE 2.1 MCG/KG/MIN: 1 INJECTION INTRAVENOUS at 09:07

## 2019-01-01 RX ADMIN — LACTULOSE 20 G: 10 SOLUTION ORAL at 02:07

## 2019-01-01 RX ADMIN — HEPARIN SODIUM 5000 UNITS: 5000 INJECTION, SOLUTION INTRAVENOUS; SUBCUTANEOUS at 04:07

## 2019-01-01 RX ADMIN — NOREPINEPHRINE BITARTRATE 1.8 MCG/KG/MIN: 1 INJECTION INTRAVENOUS at 05:07

## 2019-01-01 RX ADMIN — NOREPINEPHRINE BITARTRATE 3 MCG/KG/MIN: 1 INJECTION INTRAVENOUS at 11:08

## 2019-01-01 RX ADMIN — NOREPINEPHRINE BITARTRATE 3 MCG/KG/MIN: 1 INJECTION INTRAVENOUS at 02:07

## 2019-01-01 RX ADMIN — CEFEPIME HYDROCHLORIDE 1 G: 1 INJECTION, SOLUTION INTRAVENOUS at 09:07

## 2019-01-01 RX ADMIN — EPINEPHRINE 0.5 MCG/KG/MIN: 1 INJECTION INTRAMUSCULAR; INTRAVENOUS; SUBCUTANEOUS at 12:08

## 2019-01-01 RX ADMIN — NOREPINEPHRINE BITARTRATE 0.98 MCG/KG/MIN: 1 INJECTION, SOLUTION, CONCENTRATE INTRAVENOUS at 10:07

## 2019-01-01 RX ADMIN — NOREPINEPHRINE BITARTRATE 0.76 MCG/KG/MIN: 1 INJECTION, SOLUTION, CONCENTRATE INTRAVENOUS at 03:07

## 2019-01-01 RX ADMIN — EPINEPHRINE 0.02 MCG/KG/MIN: 1 INJECTION INTRAMUSCULAR; INTRAVENOUS; SUBCUTANEOUS at 11:07

## 2019-01-01 RX ADMIN — NOREPINEPHRINE BITARTRATE 3 MCG/KG/MIN: 1 INJECTION INTRAVENOUS at 04:08

## 2019-01-01 RX ADMIN — PARICALCITOL 2 MCG: 5 INJECTION, SOLUTION INTRAVENOUS at 08:08

## 2019-01-01 RX ADMIN — NOREPINEPHRINE BITARTRATE 3 MCG/KG/MIN: 1 INJECTION INTRAVENOUS at 07:08

## 2019-01-01 RX ADMIN — NOREPINEPHRINE BITARTRATE 3 MCG/KG/MIN: 1 INJECTION INTRAVENOUS at 12:08

## 2019-01-01 RX ADMIN — NOREPINEPHRINE BITARTRATE 0.98 MCG/KG/MIN: 1 INJECTION INTRAVENOUS at 09:07

## 2019-01-01 RX ADMIN — AMPICILLIN SODIUM 2 G: 2 INJECTION, POWDER, FOR SOLUTION INTRAMUSCULAR; INTRAVENOUS at 12:07

## 2019-01-01 RX ADMIN — MAGNESIUM SULFATE IN WATER 2 G: 40 INJECTION, SOLUTION INTRAVENOUS at 08:07

## 2019-01-01 RX ADMIN — LACTULOSE 20 G: 10 SOLUTION ORAL at 08:08

## 2019-01-01 RX ADMIN — EPINEPHRINE 2 MCG/KG/MIN: 1 INJECTION INTRAMUSCULAR; INTRAVENOUS; SUBCUTANEOUS at 01:08

## 2019-01-01 RX ADMIN — CEFTRIAXONE 2 G: 2 INJECTION, SOLUTION INTRAVENOUS at 08:07

## 2019-01-01 RX ADMIN — CEFEPIME HYDROCHLORIDE 1 G: 1 INJECTION, SOLUTION INTRAVENOUS at 04:07

## 2019-01-01 RX ADMIN — NOREPINEPHRINE BITARTRATE 2 MCG/KG/MIN: 1 INJECTION INTRAVENOUS at 08:07

## 2019-01-01 RX ADMIN — INSULIN ASPART 1 UNITS: 100 INJECTION, SOLUTION INTRAVENOUS; SUBCUTANEOUS at 08:08

## 2019-01-01 RX ADMIN — DEXTROSE 8 MG/HR: 50 INJECTION, SOLUTION INTRAVENOUS at 01:08

## 2019-01-01 RX ADMIN — NOREPINEPHRINE BITARTRATE 1 MCG/KG/MIN: 1 INJECTION INTRAVENOUS at 04:07

## 2019-01-01 RX ADMIN — SODIUM BICARBONATE: 84 INJECTION, SOLUTION INTRAVENOUS at 07:07

## 2019-01-01 RX ADMIN — SODIUM BICARBONATE: 84 INJECTION, SOLUTION INTRAVENOUS at 01:08

## 2019-01-01 RX ADMIN — EPINEPHRINE 2 MCG/KG/MIN: 1 INJECTION PARENTERAL at 06:08

## 2019-01-01 RX ADMIN — EPINEPHRINE 2 MCG/KG/MIN: 1 INJECTION PARENTERAL at 04:08

## 2019-01-01 RX ADMIN — AMPICILLIN SODIUM 2 G: 2 INJECTION, POWDER, FOR SOLUTION INTRAMUSCULAR; INTRAVENOUS at 01:08

## 2019-01-01 RX ADMIN — CEFTRIAXONE 2 G: 2 INJECTION, SOLUTION INTRAVENOUS at 09:07

## 2019-01-01 RX ADMIN — VANCOMYCIN HYDROCHLORIDE 2500 MG: 500 INJECTION, POWDER, LYOPHILIZED, FOR SOLUTION INTRAVENOUS at 04:07

## 2019-01-01 RX ADMIN — NOREPINEPHRINE BITARTRATE 3 MCG/KG/MIN: 1 INJECTION INTRAVENOUS at 06:08

## 2019-01-01 RX ADMIN — NOREPINEPHRINE BITARTRATE 3 MCG/KG/MIN: 1 INJECTION INTRAVENOUS at 10:07

## 2019-01-01 RX ADMIN — NOREPINEPHRINE BITARTRATE 3 MCG/KG/MIN: 1 INJECTION INTRAVENOUS at 12:07

## 2019-07-28 PROBLEM — J96.21 ACUTE ON CHRONIC RESPIRATORY FAILURE WITH HYPOXIA: Status: ACTIVE | Noted: 2019-01-01

## 2019-07-28 PROBLEM — G93.1 ANOXIC ENCEPHALOPATHY: Status: ACTIVE | Noted: 2019-01-01

## 2019-07-28 PROBLEM — I46.9 CARDIAC ARREST: Status: ACTIVE | Noted: 2019-01-01

## 2019-07-28 PROBLEM — R57.9 SHOCK, UNSPECIFIED: Status: ACTIVE | Noted: 2019-01-01

## 2019-07-28 PROBLEM — R53.81 DEBILITY: Status: ACTIVE | Noted: 2019-01-01

## 2019-07-28 PROBLEM — D62 ACUTE BLOOD LOSS ANEMIA: Status: ACTIVE | Noted: 2019-01-01

## 2019-07-28 PROBLEM — I48.91 ATRIAL FIBRILLATION: Status: ACTIVE | Noted: 2019-01-01

## 2019-07-28 PROBLEM — D69.6 THROMBOCYTOPENIA: Status: ACTIVE | Noted: 2019-01-01

## 2019-07-28 PROBLEM — R78.81 ENTEROCOCCAL BACTEREMIA: Status: ACTIVE | Noted: 2019-01-01

## 2019-07-28 PROBLEM — I50.22 CHRONIC SYSTOLIC CONGESTIVE HEART FAILURE: Status: ACTIVE | Noted: 2019-01-01

## 2019-07-28 PROBLEM — K72.00 SHOCK LIVER: Status: ACTIVE | Noted: 2019-01-01

## 2019-07-28 PROBLEM — R65.21 SEPTIC SHOCK: Status: ACTIVE | Noted: 2019-01-01

## 2019-07-28 PROBLEM — I25.10 CORONARY ARTERY DISEASE INVOLVING NATIVE CORONARY ARTERY: Status: ACTIVE | Noted: 2019-01-01

## 2019-07-28 PROBLEM — N18.6 ESRD (END STAGE RENAL DISEASE): Status: ACTIVE | Noted: 2019-01-01

## 2019-07-28 PROBLEM — B95.2 ENTEROCOCCAL BACTEREMIA: Status: ACTIVE | Noted: 2019-01-01

## 2019-07-28 PROBLEM — E11.9 TYPE 2 DIABETES MELLITUS: Status: ACTIVE | Noted: 2019-01-01

## 2019-07-28 PROBLEM — E44.1 MALNUTRITION OF MILD DEGREE: Status: ACTIVE | Noted: 2019-01-01

## 2019-07-28 PROBLEM — A41.9 SEPTIC SHOCK: Status: ACTIVE | Noted: 2019-01-01

## 2019-07-28 NOTE — ASSESSMENT & PLAN NOTE
-He has had low platelets at times since at least 2017  -Currently worse than baseline, likely due to sepsis  -Avoid anticoagulation for now  -Repeat labs in AM.

## 2019-07-28 NOTE — ASSESSMENT & PLAN NOTE
Suspect septic shock but patient has a newly reduced EF after his cardiac arrest (EF 25%, previously 40-45%).  He also presented with GIB but his Hgb at OSH has been relatively stable.  - Levo/Vaso to maintain MAPs > 65, have added stress dose steroids - would add Epi as a third agent  - Get formal ECHO to re-evaluate given worsening shock despite aggressive measures - can get better assessment of RV, have ordered LE dopplers as well as he has only been on SCDs  - Volume management with CRRT, given his significant vasopressor requirement, uncertain whether we can actually pull off any volume  - Lactate > 12  - Did a small volume paracentesis at the bedside and sent for studies - repeat blood cultures here  - Continue broad spectrum abx, Vanc/Zosyn (or Cefepime/Flagyl are reasonable) - if he continues to worsen, would broaden to Meropenem and start an anti-fungal  - Monitor Hgb to ensure no contribution of hemorrhage

## 2019-07-28 NOTE — ASSESSMENT & PLAN NOTE
Had a Dieulafoy's lesion that was intervened on at OSH.  Hgb stable there.  - Monitor Hgb and transfuse for < 7  - Keep PLT > 50 if evidence of bleeding, limited data that supports the use of FFP to correct coagulopathy in cirrhotics

## 2019-07-28 NOTE — ASSESSMENT & PLAN NOTE
Intubated after his cardiac arrest - remains intubated despite minimal vent settings given encephalopathy.  - Continue LPV @ 6cc/kg with goal Pplat < 30  - SAT/SBT when appropriate, current clinical course precludes this  - Hold all sedation for now

## 2019-07-28 NOTE — SUBJECTIVE & OBJECTIVE
Past Medical History:   Diagnosis Date    Atrial fibrillation     CAD in native artery     Chronic hypoxemic respiratory failure     On 2LPM O2 at home    Chronic systolic CHF (congestive heart failure)     COPD (chronic obstructive pulmonary disease)     Diabetes mellitus     ESRD (end stage renal disease) on dialysis     History of Clostridium difficile colitis 05/2019    Hyperlipidemia        Past Surgical History:   Procedure Laterality Date    AV FISTULA PLACEMENT      CORONARY ANGIOPLASTY WITH STENT PLACEMENT      HERNIA REPAIR         Review of patient's allergies indicates:  No Known Allergies    Family History     None        Tobacco Use    Smoking status: Former Smoker     Last attempt to quit: 7/28/2004     Years since quitting: 15.0   Substance and Sexual Activity    Alcohol use: Not Currently    Drug use: Never    Sexual activity: Not on file         Review of Systems   Unable to obtain.    Objective:     Vital Signs (Most Recent):  Temp: 97.6 °F (36.4 °C) (07/28/19 1515)  Pulse: (!) 116 (07/28/19 1615)  Resp: 13 (07/28/19 1615)  BP: (!) 92/54 (07/28/19 1615)  SpO2: (!) 84 % (07/28/19 1615) Vital Signs (24h Range):  Temp:  [97.6 °F (36.4 °C)] 97.6 °F (36.4 °C)  Pulse:  [] 116  Resp:  [11-20] 13  SpO2:  [84 %-96 %] 84 %  BP: ()/(45-56) 92/54  Arterial Line BP: ()/(42-48) 94/42     Weight: 116 kg (255 lb 11.7 oz)  Body mass index is 33.74 kg/m².    No intake or output data in the 24 hours ending 07/28/19 1643    Physical Exam  General: Intubated, unresponsive, intermittently performing rhythmic movements of his jaw and neck  HEENT: ETT in place, PERRL  CV: Irregular, tacyhcardic  Pulm: CTA anteriorly  Abd: Distended, soft  Ext: +pitting edema, LUE AVF w/ thrill, R chest tunneled catheter, R fem TLC  Neuro: Unresponsive off sedation    Vents:  Vent Mode: VC-AC (07/28/19 1515)  Ventilator Initiated: Yes (07/28/19 1451)  Set Rate: 20 bmp (07/28/19 1515)  PEEP/CPAP: 5 cmH20  (07/28/19 1515)  Oxygen Concentration (%): 30 (07/28/19 1615)  Peak Airway Pressure: 20 cmH2O (07/28/19 1515)  Total Ve: 11.7 mL (07/28/19 1515)  F/VT Ratio<105 (RSBI): (!) 26.03 (07/28/19 1515)    Lines/Drains/Airways     Central Venous Catheter Line                 Hemodialysis Catheter 07/23/19 right subclavian 5 days         Percutaneous Central Line Insertion/Assessment - triple lumen  07/23/19 right femoral vein;right femoral 5 days          Drain                 NG/OG Tube orogastric Center mouth -- days         Hemodialysis AV Fistula 06/20/19 Left upper arm 38 days          Airway                 Airway - Non-Surgical 07/28/19 Endotracheal Tube less than 1 day          Arterial Line                 Arterial Line 07/23/19 Right Radial 5 days                Significant Labs:    CBC/Anemia Profile:  No results for input(s): WBC, HGB, HCT, PLT, MCV, RDW, IRON, FERRITIN, RETIC, FOLATE, [id number], OCCULTBLOOD in the last 48 hours.     Chemistries:  Recent Labs   Lab 07/28/19  1601   MG 1.9   PHOS 7.5*       All pertinent labs within the past 24 hours have been reviewed.    Significant Imaging:   I have reviewed and interpreted all pertinent imaging results/findings within the past 24 hours.

## 2019-07-28 NOTE — ASSESSMENT & PLAN NOTE
-Patient has been receiving dialysis at [facility], but transferred here for profound acidosis and hypotension requiring CRRT  -ABG at this time shows pH 7.23, pCO2 36.8 and pO2 of 261.  -Dr. Ko will be consulted for CRRT today.  -Check CMP now.

## 2019-07-28 NOTE — ASSESSMENT & PLAN NOTE
-Unsure what his actually neurologic condition is due to sedation  -He does withdraw from noxious stimuli in all extremities.  -Continue to assess in the coming days.

## 2019-07-28 NOTE — PLAN OF CARE
[facility] [place] admissions ONLY: Please call extension [phone] upon patient arrival to floor for Hospital Medicine admit team assignment and for additional admit orders for the patient. Do not page the attending, staff physician or Advanced Practice Provider with the patient on arrival (may not be in-house at the time of arrival). Call back or wait to leave beeper number when prompted.     (Physician in Lead of Transfers) /Regional Referral Center Note    Patients name/MRN: Virgil Barker  [id number]  Referral Facility:  [facility]   Referring Physician or Mid-Level provider giving report/ Contact number: Dawna Lorenzo [phone] (ED)  Accepting facility: [facility] ([facility])     Date/Time of Acceptance: 7/28/2019 10:26 AM  :  Nataliya Horn M.D.  Consulting Physicians from [facility] involved in case:   will need Nephrology for CRRT    Reason for transfer request:  CRRT. Pt on ventilator with acute CHF and Acute renal Failure    Report from Physician/Mid-Level Provider/HPI:  62 yo male with a PMH of Anemia, c diff, CHF EF 25-30%, CKD5 - on HD, COPD, CAD, DM, HLP, HTN, Obesity, Chronic resp failure on home oxygen, Renal disorder.  He presented to ED in [facility] with hypotension on HD 88/50.   Patient was admitted 7/1 and was found to be anemic Hb 7.1.  He was transfused 3 units of PRBCs. Plavix and ASA were held.     He was found to have VRE bacteremia, treated with vanc and zosyn.     He was taken to the OR for a HD line exchange and had a cardiac arrest on 7/23.  Post code had acute pulmonary edema and a change in mental status. He required pressure and ventilatory support.   Echo: HFrEF EF 25-30% echo 7/23.  BP remains low on 2 pressors, and specialist are recommending CRRT.  ABG: pH 6.9 -> 7.2  two hours ago.    After the cardiac arrest his mental status has declined.  He is currently sedated on pressidex, and when awakened he is alert but not following commands and he shakes  head.  He was seen by Neurology.  CT head neg. There was no stroke or other pathology.   Uncertain if hypoxic, metabolic or ICU encephalopathy.  He has new cognitive impairment but not brain dead.     BP dropped on propafol in the ICU, 80% FIO2, on ventilator.   There has been no diarrhea.  His DM2 is well controlled on insulin SS.  He was placed on Tube Feeds per NG, which were held last night    Meds: metoprolol, digoxin, insulin, levophed, protonix, cricket-synephrine, propafol, vancomycin, vasopressin.    VS  97.9, 118, 20, 96/45, 116/51, 94% on ventilator  Labs: A1c 4.9, chol 96, trop .24 , Hb 9.1  Imaging:  cxr - no pulmonary edema     ICU to ICU transfer    Problem List:  Enterococcus bacteremia - on vanc, zosyn  ESRD - HD  Anemia  s/p cardiac arrest  Cardiogenic shock  Hypoxic, metabolic or ICU encephalopathy  Acute CHF  Chronic respiratory failure  Acute renal failure    To Do List upon arrival:    Admit to ICU  Repeat lab and cultures  Consult nephrology, neurology,   Resume tube feeds  Goals of care discussions w family Nataliya Horn M.D., MD  Senior Staff Physician  Department of Hospital Medicine  Patient Flow Center/   [phone]

## 2019-07-28 NOTE — ASSESSMENT & PLAN NOTE
-Echo 6/10/18 showed EF 40-45%  -Echo 7/23/19 showed EF 25-30% with severe pulmonary hypertension  -Not on ace/arb or beta blocker at this time due to septic shock  -Strict ins and outs and daily weights  -CRRT for gentle fluid removal  -Consult Dr. Zafar.

## 2019-07-28 NOTE — ASSESSMENT & PLAN NOTE
Most recent EF 25-30%.  Likely contributing to shock.  - Repeat ECHO to better assess RV as well.    - Volume management w/ CRR  - Continue Levo/Vaso, if he worsens may benefit from a trial of inotropes but with his uncontrolled RVR presently, that may be a challenge

## 2019-07-28 NOTE — ASSESSMENT & PLAN NOTE
Cultures reportedly cleared at OSH.  Grew VRE (E. Faecalis)  - Repeat cultures here, continue Vanc  - Line has been changed out

## 2019-07-28 NOTE — ASSESSMENT & PLAN NOTE
Unresponsive after rewarming w/ therapeutic hypothermia protocol.  Concerning for significant anoxic injury despite short duration of code.  Rhythmic movements and chewing ETT could be related to seizure.  If he has seizures post-arrest, there is little we can do to fix that.  - Check EEG, this would largely be prognostic  - If he has persistent seizure like movements, could initiate propofol but his BP is not likely to tolerate this  - Would hold all sedation for now to better neuroprognosticate   - Check ammonia, treat if elevated

## 2019-07-28 NOTE — ASSESSMENT & PLAN NOTE
-AST 3648 and ALT 1438 this morning  -Abd US at [facility] was notable for nodular liver and ascites and there was suspicion of possible cirrhosis  -Previous labs at [facility] showed a negative acute hepatitis panel and negative HIV.  -Most likely due to shock liver, but could also be affected by amiodarone  -Continue treatment of sepsis as above.  -Repeat labs in AM.  -Check INR.

## 2019-07-28 NOTE — PLAN OF CARE
Problem: Adult Inpatient Plan of Care  Goal: Plan of Care Review  Outcome: Ongoing (interventions implemented as appropriate)  Patient received orally intubutaed on charted settings.  All alarms are set and audible.  Ambu bag and mask @ HOB.  Will continue to monitor sloesly.

## 2019-07-28 NOTE — CONSULTS
Consult Note  Nephrology    Consult Requested By: Jasbir Garsia MD  Reason for Consult: ESRD    SUBJECTIVE:     History of Present Illness:  Patient is a 63 y.o. male with CAD, CHF, ESRD on HD, COPD on home O2, formerly followed in my CKD clinic, transferred from [facility] for CRRT.  I used to follow this patient in my CKD clinic, seeing him in my clinic ~ Fall 2018 and had recommended RRT for nearly a year prior but pt had been resistant to starting.  He has started HD over the last 6 months under the care of Dr. Mac Moreno and Dr. Brandon Beltrán.    Hospital course at [facility] significant for anemia, septic shock due to Enterococcus bacteremia, and was transfused 3u PRBCs.    EGD revealed Dieulafoy lesion.  He was taken to OR on 7/23 for HD catheter exchange and suffered cardiac arrest with ROSC after 5 minutes.  He apparently underwent hypothermia protocol, then developed afib with RVR.  Since then, he has been in shock requiring 2 pressors.  CT head negative, but per Neuro, he has significant cognitive impairment, but is not brain dead.  Abd U/S showed ascites and liver cirrhosis.    Seen in ICU, intubated, off sedation.    Past Medical History:   Diagnosis Date    Atrial fibrillation     CAD in native artery     Chronic hypoxemic respiratory failure     On 2LPM O2 at home    Chronic systolic CHF (congestive heart failure)     COPD (chronic obstructive pulmonary disease)     Diabetes mellitus     ESRD (end stage renal disease) on dialysis     History of Clostridium difficile colitis 05/2019    Hyperlipidemia      Past Surgical History:   Procedure Laterality Date    AV FISTULA PLACEMENT      CORONARY ANGIOPLASTY WITH STENT PLACEMENT      HERNIA REPAIR       History reviewed. No pertinent family history.  Social History     Tobacco Use    Smoking status: Former Smoker     Last attempt to quit: 7/28/2004     Years since quitting: 15.0   Substance Use Topics    Alcohol use: Not Currently    Drug use:  Never       Review of patient's allergies indicates:  No Known Allergies     Review of Systems:  Review of systems not obtained due to patient factors unable to obtain, as pt obtunded off sedation..     OBJECTIVE:     Vital Signs (Most Recent)  Temp: 97.6 °F (36.4 °C) (07/28/19 1515)  Pulse: (!) 116 (07/28/19 1615)  Resp: 13 (07/28/19 1615)  BP: (!) 92/54 (07/28/19 1615)  SpO2: (!) 84 % (07/28/19 1615)    Vital Signs Range (Last 24H):  Temp:  [97.6 °F (36.4 °C)]   Pulse:  []   Resp:  [11-20]   BP: ()/(45-56)   SpO2:  [84 %-96 %]   Arterial Line BP: ()/(42-48)     Physical Exam:  Gen: obtunded, disheveled, off sedation  HEENT: poor dentition, ETT in place, sclera anicteric  CV: RRR, no m/r  Resp: coarse breath sounds  GI: soft, distended with abdominal ascites and + fluid wave  Extr: 1+ BLE edema  Skin: Chronic venous stasis of BLE  Access: LUE AVF with palpable thrill, R IJ HD catheter.     ceFEPime (MAXIPIME) IVPB  1 g Intravenous Q8H    hydrocortisone sodium succinate  100 mg Intravenous Q8H    pantoprazole  40 mg Intravenous BID    vancomycin (VANCOCIN) IVPB  2,500 mg Intravenous Once       Laboratory:    No results for input(s): NA, K, CL, CO2, BUN, CREATININE, GLUCOSE, CALCIUM, PHOS in the last 168 hours.    Invalid input(s): EGFR, LABALBU  No results for input(s): WBC, HGB, HCT, PLT in the last 168 hours.       Diagnostic Results:  Labs: Reviewed  X-Ray: Reviewed  CT: Reviewed    ASSESSMENT/PLAN:     ESRD on HD with hypertensive heart and acute on chronic systolic HF  - Two Physician emergency consent obtained.  Attempted to call wife twice to get consent.    - initiate CRRT for volume removal and metabolic clearance, net UF 100ml/hr as BP tolerates.  - Surgeon Dr. Deysi Cantu who placed LUE AVF last month.    Septic shock due to Enterococcus bacteremia  - BCx from [facility] reviewed and susceptible to Vanc, Ampicillin, Gent  - Wean pressors to maintain MAP>60.    Pulmonary HTN, afib with  RVR  - Followed as outpt by Dr. Jose Bernstein.    Cardiac arrest  - Will need Neuro input on brain activity as he has completed hypothermia protocol at [facility].    Ischemic hepatitis  - Due to shock.    Dispo: Grim prognosis.  D/W Pulm/CC fellow.    Thank you for the consult.  We will continue to follow along with you.    Yayo Ko MD  Nephrology

## 2019-07-28 NOTE — ASSESSMENT & PLAN NOTE
Reportedly in the context of hypoxemia?  Aspiration? Anesthesia related?  Code reportedly short in duration and may not have lost a pulse.  S/p therapeutic hypothermia.  Neurologic status poor.  - Neuro checks  - Continue vasopressors as above

## 2019-07-28 NOTE — HPI
63yoM with hx of ESRD recently started on HD (L AVF placed 6/20), anemia, CDiff colitis, DM2, HTN, HFrEF (EF 40% earlier this month), COPD who presented to [facility] on 7/17 with hypotension during dialysis.  He was admitted at that time with GIB requiring transfusion (Hgb 6.1 with melena) and for treatment of HCAP given a LLL infiltrate seen on CXR and complaints of a cough.  He was on DAPT at the time of presentation and this was held.  He was transfused 3U PRBC w/ HD and started on broad spectrum abx (Vanc/Zosyn/Levaquin). GI was consulted and EGD demonstrated a Dieulafoy lesion which was intervened on in some fashion (documentation provided and Care Everywhere are unclear as to the intervention).  He was then found to have VRE bacteremia (E. Faecalis) and his tunneled cathter was removed.  He went to the OR on 7/23 to have a new one placed.  Documentation is unclear, but it appears that he coded for 4-5 minutes lorena-procedurally secondary to 'hypoxia,' though its uncertain whether he lost a pulse or whether it was just 'thready.'  Regardless, he got chest compressions.  Afterward, he was transferred to the ICU and started on therapeutic hypothermia and developed Afib w/ RVR and was started on Amio and developed marked transaminitis and this was discontinued.  He has required some measure of vasopressor support since his arrest.  He was reportedly markedly edematous and HD was performed to get off volume during the week.  Abdominal U/S demonstrated cirrhosis w/ ascites.  This morning he was reportedly chewing on his ETT and when it was adjusted he became hypoxic and hypertensive?  He has not had any purposeful movements since his cardiac arrest.  He was transferred to [facility] for CRRT given his escalating vasopressor requirements.

## 2019-07-28 NOTE — H&P
[facility] Hospital Medicine  History & Physical    Patient Name: Virgil Barker  MRN: [id number]  Admission Date: 7/28/2019  Attending Physician: Jasbir Garsia MD  Primary Care Provider: Primary Doctor No         Patient information was obtained from past medical records and ER records.     Subjective:     Principal Problem:Septic shock    Chief Complaint: No chief complaint on file.       HPI: Mr. Barker is a 63 year old man with history of ESRD, chronic systolic chf, chronic hypoxic respiratory failure, coronary artery disease, atrial fibrillation, hyperlipidemia and history of C.diff colitis in May of this year who was transferred from [facility] for evaluation by nephrology for CRRT.  He is intubated and sedated and unable to provide history.  I have obtained history by review of his chart.    Mr. Barker was admitted to [facility] 7/17 with profound anemia, weakness and hypotension.  He was transfused 2 units PRBC and taken for EGD on 7/19 by Dr. Mary Motley.  The EGD showed a dieulafoy lesion and small gastric ulcer which was clipped and biopsied.  The biopsy did not reveal any cancer and was negative for H.pylori.  Additionally, the patient was found to have sepsis due to Enterococcal bacteremia presumed due to a dialysis line infection.  The line was removed and he was given a line holiday.  He was treated with vancomycin and zosyn.  Subsequently he was taken to the OR for placement of a tunneled dialysis catheter.  In the OR he became hypoxic with respiratory distress while in the [place] position.  His chart has conflicting notation regarding cardiac arrest.  Apparently he did receive ACLS therapies for 4-5 minutes and was intubated.  He did under go therapeutic hypothermia protocol but remained with a profound encephalopathy.  Notation by his cardiologist there indicates there was no definitive evidence of cardiac arrest.  His surgeon believed his pulses were thready.   "Post "arrest" EKG did not reveal any acute injury pattern.  Post arrest echo however did reveal a decrease in EF from 40-45% (6/10/18) to 25-30% (7/23/19).  He required treatment with pressors and was transferred to the ICU there.  In the ICU he received dialysis and his pulmonary edema improved.  He did develop atrial fibrillation with rvr and was started on an amiodarone drip, which was subsequently discontinued when he developed significant transaminitis.  In the 24 hours prior to transfer here, he required more pressor support with vasopressin, norepinephrine, neosynephrine and a bicarb drip.  This morning he was "chewing" on his ETT and disloged it with subsequent hypoxia and worsening hypotension.  A rapid response was called, the tube was repositioned and a fentanyl drip was started and subsequently changed to precedex.  Per his attending notes, he was seen by neurology who are not concerned for brain death.  This morning, pH was noted to be 6.98 and cardiology and nephrology there recommended transfer to a facility where he could receive continuous renal replacement therapy.  At the time of arrival to [facility] he is calmly sedated on a precedex drip with ongoing norepinephrine and vasopressin drips.    Past Medical History:   Diagnosis Date    Atrial fibrillation     CAD in native artery     Chronic hypoxemic respiratory failure     On 2LPM O2 at home    Chronic systolic CHF (congestive heart failure)     COPD (chronic obstructive pulmonary disease)     Diabetes mellitus     ESRD (end stage renal disease) on dialysis     History of Clostridium difficile colitis 05/2019    Hyperlipidemia        Past Surgical History:   Procedure Laterality Date    AV FISTULA PLACEMENT      CORONARY ANGIOPLASTY WITH STENT PLACEMENT      HERNIA REPAIR         Review of patient's allergies indicates:  No Known Allergies    No current facility-administered medications on file prior to encounter.      No current " outpatient medications on file prior to encounter.     Family History     None        Tobacco Use    Smoking status: Former Smoker     Last attempt to quit: 7/28/2004     Years since quitting: 15.0   Substance and Sexual Activity    Alcohol use: Not Currently    Drug use: Never    Sexual activity: Not on file     Review of Systems   Unable to perform ROS: Intubated     Objective:     Vital Signs (Most Recent):  Temp: 97.6 °F (36.4 °C) (07/28/19 1515)  Pulse: (!) 116 (07/28/19 1615)  Resp: 13 (07/28/19 1615)  BP: (!) 92/54 (07/28/19 1615)  SpO2: (!) 84 % (07/28/19 1615) Vital Signs (24h Range):  Temp:  [97.6 °F (36.4 °C)] 97.6 °F (36.4 °C)  Pulse:  [] 116  Resp:  [11-20] 13  SpO2:  [84 %-96 %] 84 %  BP: ()/(45-56) 92/54  Arterial Line BP: ()/(42-48) 94/42     Weight: 116 kg (255 lb 11.7 oz)  Body mass index is 33.74 kg/m².    Physical Exam   Constitutional: He appears well-developed and well-nourished.   HENT:   Head: Normocephalic and atraumatic.   ETT and OGT in place, poor dentition noted   Eyes: Pupils are equal, round, and reactive to light. EOM are normal.   Neck: Normal range of motion. Neck supple.   Right tunneled line noted.  Also a strong vascular pulsation noted at the central base of his neck.   Cardiovascular:   Tachycardic, no murmurs appreciated, right femoral TLC and right chest/neck catheter.   Pulmonary/Chest: Effort normal and breath sounds normal. No respiratory distress.   Abdominal: Soft. He exhibits no distension. There is no tenderness.   Non-distended, non-tender, no apparent bowel sounds.   Musculoskeletal: He exhibits no edema.   Neurological:   Sedated, withdraws to noxious stimuli in all extremities.   Skin: Skin is warm and dry.   Stage I sacral breakdown, stage II ulcer to left shin   Vitals reviewed.        CRANIAL NERVES     CN III, IV, VI   Pupils are equal, round, and reactive to light.  Extraocular motions are normal.        Significant Labs: All pertinent  "labs within the past 24 hours have been reviewed.    Significant Imaging: I have reviewed and interpreted all pertinent imaging results/findings within the past 24 hours.    Assessment/Plan:     * Septic shock  -Mr. Barekr is admitted to inpatient status in the ICU  -He has septic shock due to Enterococcal bacteremia presumably from a dialysis line infection  -Line was removed, received line holiday, and new line replaced at [facility]  -blood cultures 7/17 at [facility] positive for Vanc sensitive Enterococcus faecalis.  Repeat cultures 7/21 NGTD  -Obtain blood cultures here today  -Treat with vanc and cefepime  -Continue norepinephrine and vasopressin for BP support.  -Consult pulm critical care.  -Consult ID in AM.      Enterococcal bacteremia  -Tratment as above.      ESRD (end stage renal disease) with Severe Anion Gap Acidosis  -Patient has been receiving dialysis at [facility], but transferred here for profound acidosis and hypotension requiring CRRT  -ABG at this time shows pH 7.23, pCO2 36.8 and pO2 of 261.  -Dr. Ko will be consulted for CRRT today.  -Check CMP now.      Acute on chronic respiratory failure with hypoxia  -History of COPD and Systolic CHF noted.  -At home is on 2LPM of O2 by NC  -Currently intubated and ventilating well  -ABG noted as above  -Order CXR  -Consult Pulm critical care.      Cardiac arrest  -Chart from [facility] notes 4-5 minutes of ACLS therapy with ROSC and neurologically intact.  Chart also notes that his cardiologist was not confident that he actually had a cardiac arrest as EKG did not show acute injury and his surgeon stated he had a "thready pulse"  -Echo from 6/10/18 showed EF 40-45% and repeat echo 7/23 showed EF 25-30%  -Not on arb/ace or beta blocker at this time due to profound hypotension.      Anoxic encephalopathy  -Unsure what his actually neurologic condition is due to sedation  -He did undergo post-arrest therapeutic hypothermia protocol at [facility]  -He does withdraw from " noxious stimuli in all extremities.  -Continue to assess in the coming days.      Chronic systolic congestive heart failure  -Echo 6/10/18 showed EF 40-45%  -Echo 7/23/19 showed EF 25-30% with severe pulmonary hypertension  -Not on ace/arb or beta blocker at this time due to septic shock  -Strict ins and outs and daily weights  -CRRT for gentle fluid removal  -Consult Dr. Zafar.      Atrial fibrillation  -Was on anticoagulation prior to hospitalization  -Presently in afib with rapid rate but has just arrived and been rather stimulated  -No AC at this time due to recent GI bleeding  -Was treated with amiodarone at [facility] but developed severe transaminitis so it was discontinued  -Consult Dr. Brown.      Coronary artery disease involving native coronary artery  -history of stent noted  -Holding aspirin, plavix and statin.      Shock liver with possible cirrhosis  -AST 3648 and ALT 1438 this morning  -Abd US at [facility] was notable for nodular liver and ascites and there was suspicion of possible cirrhosis  -Previous labs at [facility] showed a negative acute hepatitis panel and negative HIV.  -Most likely due to shock liver, but could also be affected by amiodarone  -Continue treatment of sepsis as above.  -Repeat labs in AM.  -Check INR.    Thrombocytopenia  -He has had low platelets at times since at least 2017  -Currently worse than baseline, likely due to sepsis but was on heparin drip until 7/25 at [facility].  -Check HITAb.  -Avoid anticoagulation for now  -Repeat labs in AM.      Type 2 diabetes mellitus  -Check A1c  -Recently was 4.9  -Treat with insulin sliding scale q4h for now.      Debility  -Consult PT/OT when extubated.      Malnutrition of mild degree  -Start tube feeds  -Consult nutrition for recommendations.      Acute blood loss anemia  -Requred 4 units PRBC at [facility] early in his stay  -Had EGD there on 7/19 showing dileufoy leasion and gastric ulcer which was clipped  -Continue BID PPI for now.        VTE Risk  Mitigation (From admission, onward)        Ordered     Place sequential compression device  Until discontinued      07/28/19 1458     IP VTE HIGH RISK PATIENT  Once      07/28/19 1458         45 minutes cc time.    Jasbir Garsia MD  Department of Hospital Medicine   [facility]

## 2019-07-28 NOTE — ASSESSMENT & PLAN NOTE
-History of COPD and Systolic CHF noted.  -At home is on 2LPM of O2 by NC  -Currently intubated and ventilating well  -ABG noted as above  -Order CXR  -Consult Pulm critical care.

## 2019-07-28 NOTE — SUBJECTIVE & OBJECTIVE
Past Medical History:   Diagnosis Date    Atrial fibrillation     CAD in native artery     Chronic hypoxemic respiratory failure     On 2LPM O2 at home    Chronic systolic CHF (congestive heart failure)     COPD (chronic obstructive pulmonary disease)     Diabetes mellitus     ESRD (end stage renal disease) on dialysis     History of Clostridium difficile colitis 05/2019    Hyperlipidemia        Past Surgical History:   Procedure Laterality Date    AV FISTULA PLACEMENT      CORONARY ANGIOPLASTY WITH STENT PLACEMENT      HERNIA REPAIR         Review of patient's allergies indicates:  No Known Allergies    No current facility-administered medications on file prior to encounter.      No current outpatient medications on file prior to encounter.     Family History     None        Tobacco Use    Smoking status: Former Smoker     Last attempt to quit: 7/28/2004     Years since quitting: 15.0   Substance and Sexual Activity    Alcohol use: Not Currently    Drug use: Never    Sexual activity: Not on file     Review of Systems   Unable to perform ROS: Intubated     Objective:     Vital Signs (Most Recent):  Temp: 97.6 °F (36.4 °C) (07/28/19 1515)  Pulse: (!) 116 (07/28/19 1615)  Resp: 13 (07/28/19 1615)  BP: (!) 92/54 (07/28/19 1615)  SpO2: (!) 84 % (07/28/19 1615) Vital Signs (24h Range):  Temp:  [97.6 °F (36.4 °C)] 97.6 °F (36.4 °C)  Pulse:  [] 116  Resp:  [11-20] 13  SpO2:  [84 %-96 %] 84 %  BP: ()/(45-56) 92/54  Arterial Line BP: ()/(42-48) 94/42     Weight: 116 kg (255 lb 11.7 oz)  Body mass index is 33.74 kg/m².    Physical Exam   Constitutional: He appears well-developed and well-nourished.   HENT:   Head: Normocephalic and atraumatic.   ETT and OGT in place, poor dentition noted   Eyes: Pupils are equal, round, and reactive to light. EOM are normal.   Neck: Normal range of motion. Neck supple.   Right tunneled line noted.  Also a strong vascular pulsation noted at the central base of  his neck.   Cardiovascular:   Tachycardic, no murmurs appreciated, right femoral TLC and right chest/neck catheter.   Pulmonary/Chest: Effort normal and breath sounds normal. No respiratory distress.   Abdominal: Soft. He exhibits no distension. There is no tenderness.   Non-distended, non-tender, no apparent bowel sounds.   Musculoskeletal: He exhibits no edema.   Neurological:   Sedated, withdraws to noxious stimuli in all extremities.   Skin: Skin is warm and dry.   Stage I sacral breakdown, stage II ulcer to left shin   Vitals reviewed.        CRANIAL NERVES     CN III, IV, VI   Pupils are equal, round, and reactive to light.  Extraocular motions are normal.        Significant Labs: All pertinent labs within the past 24 hours have been reviewed.    Significant Imaging: I have reviewed and interpreted all pertinent imaging results/findings within the past 24 hours.

## 2019-07-28 NOTE — ASSESSMENT & PLAN NOTE
-Was on anticoagulation prior to hospitalization  -Presently in afib with rapid rate but has just arrived and been rather stimulated  -No AC at this time due to recent GI bleeding  -Was treated with amiodarone at [facility] but developed severe transaminitis so it was discontinued  -Consult Dr. Brown.

## 2019-07-28 NOTE — CONSULTS
[facility]  Pulmonology  Consult Note    Patient Name: Virgil Barker  MRN: [id number]  Admission Date: 7/28/2019  Hospital Length of Stay: 0 days  Code Status: Full Code  Attending Physician: Jasbir Garsia MD  Primary Care Provider: Primary Doctor No   Principal Problem: Shock, unspecified    Inpatient consult to Pulmonary Critical Care  Consult performed by: Trevor June MD  Consult ordered by: Jasbir Garsia MD        Subjective:     HPI:  63yoM with hx of ESRD recently started on HD (L AVF placed 6/20), anemia, CDiff colitis, DM2, HTN, HFrEF (EF 40% earlier this month), COPD who presented to [facility] on 7/17 with hypotension during dialysis.  He was admitted at that time with GIB requiring transfusion (Hgb 6.1 with melena) and for treatment of HCAP given a LLL infiltrate seen on CXR and complaints of a cough.  He was on DAPT at the time of presentation and this was held.  He was transfused 3U PRBC w/ HD and started on broad spectrum abx (Vanc/Zosyn/Levaquin). GI was consulted and EGD demonstrated a Dieulafoy lesion which was intervened on in some fashion (documentation provided and Care Everywhere are unclear as to the intervention).  He was then found to have VRE bacteremia (E. Faecalis) and his tunneled cathter was removed.  He went to the OR on 7/23 to have a new one placed.  Documentation is unclear, but it appears that he coded for 4-5 minutes lorena-procedurally secondary to 'hypoxia,' though its uncertain whether he lost a pulse or whether it was just 'thready.'  Regardless, he got chest compressions.  Afterward, he was transferred to the ICU and started on therapeutic hypothermia and developed Afib w/ RVR and was started on Amio and developed marked transaminitis and this was discontinued.  He has required some measure of vasopressor support since his arrest.  He was reportedly markedly edematous and HD was performed to get off volume during the week.  Abdominal U/S demonstrated  cirrhosis w/ ascites.  This morning he was reportedly chewing on his ETT and when it was adjusted he became hypoxic and hypertensive?  He has not had any purposeful movements since his cardiac arrest.  He was transferred to [facility] for CRRT given his escalating vasopressor requirements.    Past Medical History:   Diagnosis Date    Atrial fibrillation     CAD in native artery     Chronic hypoxemic respiratory failure     On 2LPM O2 at home    Chronic systolic CHF (congestive heart failure)     COPD (chronic obstructive pulmonary disease)     Diabetes mellitus     ESRD (end stage renal disease) on dialysis     History of Clostridium difficile colitis 05/2019    Hyperlipidemia        Past Surgical History:   Procedure Laterality Date    AV FISTULA PLACEMENT      CORONARY ANGIOPLASTY WITH STENT PLACEMENT      HERNIA REPAIR         Review of patient's allergies indicates:  No Known Allergies    Family History     None        Tobacco Use    Smoking status: Former Smoker     Last attempt to quit: 7/28/2004     Years since quitting: 15.0   Substance and Sexual Activity    Alcohol use: Not Currently    Drug use: Never    Sexual activity: Not on file         Review of Systems   Unable to obtain.    Objective:     Vital Signs (Most Recent):  Temp: 97.6 °F (36.4 °C) (07/28/19 1515)  Pulse: (!) 116 (07/28/19 1615)  Resp: 13 (07/28/19 1615)  BP: (!) 92/54 (07/28/19 1615)  SpO2: (!) 84 % (07/28/19 1615) Vital Signs (24h Range):  Temp:  [97.6 °F (36.4 °C)] 97.6 °F (36.4 °C)  Pulse:  [] 116  Resp:  [11-20] 13  SpO2:  [84 %-96 %] 84 %  BP: ()/(45-56) 92/54  Arterial Line BP: ()/(42-48) 94/42     Weight: 116 kg (255 lb 11.7 oz)  Body mass index is 33.74 kg/m².    No intake or output data in the 24 hours ending 07/28/19 1643    Physical Exam  General: Intubated, unresponsive, intermittently performing rhythmic movements of his jaw and neck  HEENT: ETT in place, PERRL  CV: Irregular,  tacyhcardic  Pulm: CTA anteriorly  Abd: Distended, soft  Ext: +pitting edema, LUE AVF w/ thrill, R chest tunneled catheter, R fem TLC  Neuro: Unresponsive off sedation    Vents:  Vent Mode: VC-AC (07/28/19 1515)  Ventilator Initiated: Yes (07/28/19 1451)  Set Rate: 20 bmp (07/28/19 1515)  PEEP/CPAP: 5 cmH20 (07/28/19 1515)  Oxygen Concentration (%): 30 (07/28/19 1615)  Peak Airway Pressure: 20 cmH2O (07/28/19 1515)  Total Ve: 11.7 mL (07/28/19 1515)  F/VT Ratio<105 (RSBI): (!) 26.03 (07/28/19 1515)    Lines/Drains/Airways     Central Venous Catheter Line                 Hemodialysis Catheter 07/23/19 right subclavian 5 days         Percutaneous Central Line Insertion/Assessment - triple lumen  07/23/19 right femoral vein;right femoral 5 days          Drain                 NG/OG Tube orogastric Center mouth -- days         Hemodialysis AV Fistula 06/20/19 Left upper arm 38 days          Airway                 Airway - Non-Surgical 07/28/19 Endotracheal Tube less than 1 day          Arterial Line                 Arterial Line 07/23/19 Right Radial 5 days                Significant Labs:    CBC/Anemia Profile:  No results for input(s): WBC, HGB, HCT, PLT, MCV, RDW, IRON, FERRITIN, RETIC, FOLATE, [id number], OCCULTBLOOD in the last 48 hours.     Chemistries:  Recent Labs   Lab 07/28/19  1601   MG 1.9   PHOS 7.5*       All pertinent labs within the past 24 hours have been reviewed.    Significant Imaging:   I have reviewed and interpreted all pertinent imaging results/findings within the past 24 hours.    Assessment/Plan:     * Shock, unspecified  Suspect septic shock but patient has a newly reduced EF after his cardiac arrest (EF 25%, previously 40-45%).  He also presented with GIB but his Hgb at OSH has been relatively stable.  - Levo/Vaso to maintain MAPs > 65, have added stress dose steroids - would add Epi as a third agent  - Get formal ECHO to re-evaluate given worsening shock despite aggressive measures - can get  better assessment of RV, have ordered LE dopplers as well as he has only been on SCDs  - Volume management with CRRT, given his significant vasopressor requirement, uncertain whether we can actually pull off any volume  - Lactate > 12  - Did a small volume paracentesis at the bedside and sent for studies - repeat blood cultures here  - Continue broad spectrum abx, Vanc/Zosyn (or Cefepime/Flagyl are reasonable) - if he continues to worsen, would broaden to Meropenem and start an anti-fungal  - Monitor Hgb to ensure no contribution of hemorrhage      Shock liver with possible cirrhosis  Transaminitis up to > 3000 after his arrest but there was a curious lag in his enzymes climbing.  Concerned for drug induced hepatitis from his Amio, which was stopped.  Uncertain mechanism for cirrhosis.  - Check Hep panel  - Sampled his ascitic fluid and sent for studies  - Caution with Amio in the future    Atrial fibrillation  Mild RVR presently.  Concern for drug induced hepatitis after Amio initiation.  - Cardiology consulted  - Would favor use of Digoxin, monitor closely given renal failure    Anoxic encephalopathy  Unresponsive after rewarming w/ therapeutic hypothermia protocol.  Concerning for significant anoxic injury despite short duration of code.  Rhythmic movements and chewing ETT could be related to seizure.  If he has seizures post-arrest, there is little we can do to fix that.  - Check EEG, this would largely be prognostic  - If he has persistent seizure like movements, could initiate propofol but his BP is not likely to tolerate this  - Would hold all sedation for now to better neuroprognosticate   - Check ammonia, treat if elevated      Chronic systolic congestive heart failure  Most recent EF 25-30%.  Likely contributing to shock.  - Repeat ECHO to better assess RV as well.    - Volume management w/ CRR  - Continue Levo/Vaso, if he worsens may benefit from a trial of inotropes but with his uncontrolled RVR  presently, that may be a challenge    Acute blood loss anemia  Had a Dieulafoy's lesion that was intervened on at OSH.  Hgb stable there.  - Monitor Hgb and transfuse for < 7  - Keep PLT > 50 if evidence of bleeding, limited data that supports the use of FFP to correct coagulopathy in cirrhotics    Acute on chronic respiratory failure with hypoxia  Intubated after his cardiac arrest - remains intubated despite minimal vent settings given encephalopathy.  - Continue LPV @ 6cc/kg with goal Pplat < 30  - SAT/SBT when appropriate, current clinical course precludes this  - Hold all sedation for now    ESRD (end stage renal disease) with Severe Anion Gap Acidosis  Now on significant vasopressors necessitating CRRT.  - Nephrology following, he appears total body volume up still but will have challenges pulling off fluid given degree of shock  - Electrolyte management per Renal    Cardiac arrest  Reportedly in the context of hypoxemia?  Aspiration? Anesthesia related?  Code reportedly short in duration and may not have lost a pulse.  S/p therapeutic hypothermia.  Neurologic status poor.  - Neuro checks  - Continue vasopressors as above    Enterococcal bacteremia  Cultures reportedly cleared at OSH.  Grew VRE (E. Faecalis)  - Repeat cultures here, continue Vanc  - Line has been changed out          Thank you for your consult. I will follow-up with patient. Please contact us if you have any additional questions.     Trevor June MD  Pulmonology  [facility]

## 2019-07-28 NOTE — ASSESSMENT & PLAN NOTE
-Requred 4 units PRBC at [facility] early in his stay  -Had EGD there on 7/19 showing dileufoy leasion and gastric ulcer which was clipped  -Continue BID PPI for now.

## 2019-07-28 NOTE — ASSESSMENT & PLAN NOTE
Mild RVR presently.  Concern for drug induced hepatitis after Amio initiation.  - Cardiology consulted  - Would favor use of Digoxin, monitor closely given renal failure

## 2019-07-28 NOTE — ASSESSMENT & PLAN NOTE
Now on significant vasopressors necessitating CRRT.  - Nephrology following, he appears total body volume up still but will have challenges pulling off fluid given degree of shock  - Electrolyte management per Renal

## 2019-07-28 NOTE — HPI
Mr. Barker is a 63 year-old man with history of chronic systolic heart failure, chronic obstructive pulmonary disease with chronic hypoxic respiratory failure on home oxygen, coronary artery disease, and end-stage renal disease started on hemodialysis via a tunneled dialysis line about 6 months ago who was admitted to [facility] on 7/17/2019 with generalized weakness and worsening in baseline hemoglobin.  Patient was transfused 3 units of pack red blood cells and he underwent upper endoscopy on 7/19/2019 which revealed a dieulafoy lesion and a small gastric ulcer which was clipped and biopsied.  Biopsy negative for malignancy and negative for H. pylori.    In addition patient was found to be septic secondary to Enterococcus bacteremia due to presumed dialysis line infection.  Patient was treated intravenous antibiotics. Presumed infected line was removed.  Patient went to the operating room for placement of a new dialysis catheter.  Patient suffered cardiac arrest and was resuscitated and required vasopressors.  Patient was transferred to the intensive care unit.  He developed atrial fibrillation rapid response which was treated with amiodarone which was subsequently discontinued due to evidence of liver injury.  While in intensive care unit unit he also received dialysis to treat pulmonary edema.  Patient became progressively more hypotensive and would not tolerate additional traditional hemodialysis and therefore was transferred to [facility] stents of care unit for initiation of continuous renal replacement therapy.

## 2019-07-28 NOTE — ASSESSMENT & PLAN NOTE
"-Chart from [facility] notes 4-5 minutes of ACLS therapy with ROSC and neurologically intact.  Chart also notes that his cardiologist was not confident that he actually had a cardiac arrest as EKG did not show acute injury and his surgeon stated he had a "thready pulse"  -Echo from 6/10/18 showed EF 40-45% and repeat echo 7/23 showed EF 25-30%  -Not on arb/ace or beta blocker at this time due to profound hypotension.    "

## 2019-07-28 NOTE — ASSESSMENT & PLAN NOTE
Transaminitis up to > 3000 after his arrest but there was a curious lag in his enzymes climbing.  Concerned for drug induced hepatitis from his Amio, which was stopped.  Uncertain mechanism for cirrhosis.  - Check Hep panel  - Sampled his ascitic fluid and sent for studies  - Caution with Amio in the future

## 2019-07-28 NOTE — ASSESSMENT & PLAN NOTE
-Mr. Barker is admitted to inpatient status in the ICU  -He has septic shock due to Enterococcal bacteremia presumably from a dialysis line infection  -Line was removed, received line holiday, and new line replaced at [facility]  -blood cultures 7/17 at [facility] positive for Vanc sensitive Enterococcus faecalis.  Repeat cultures 7/21 NGTD  -Obtain blood cultures here today  -Treat with vanc and cefepime  -Continue norepinephrine and vasopressin for BP support.  -Consult pulm critical care.  -Consult ID in AM.

## 2019-07-29 PROBLEM — Z71.89 GOALS OF CARE, COUNSELING/DISCUSSION: Status: ACTIVE | Noted: 2019-01-01

## 2019-07-29 PROBLEM — D65 DIC (DISSEMINATED INTRAVASCULAR COAGULATION): Status: ACTIVE | Noted: 2019-01-01

## 2019-07-29 PROBLEM — I50.23 ACUTE ON CHRONIC SYSTOLIC CONGESTIVE HEART FAILURE: Status: ACTIVE | Noted: 2019-01-01

## 2019-07-29 PROBLEM — D69.6 THROMBOCYTOPENIA: Status: ACTIVE | Noted: 2019-01-01

## 2019-07-29 NOTE — SUBJECTIVE & OBJECTIVE
Interval History: No acute events overnight.  Remains unresponsive to voice with profound acidosis and is ventilated on multiple pressors and CRRT has been resumed.  Wife at bedside and all questions answered.    Review of Systems   Unable to perform ROS: Intubated     Objective:     Vital Signs (Most Recent):  Temp: (!) 93.9 °F (34.4 °C) (07/29/19 1345)  Pulse: 102 (07/29/19 1345)  Resp: 16 (07/29/19 1345)  BP: (!) 117/58 (07/29/19 1345)  SpO2: (!) 92 % (07/29/19 1345) Vital Signs (24h Range):  Temp:  [91.1 °F (32.8 °C)-97.6 °F (36.4 °C)] 93.9 °F (34.4 °C)  Pulse:  [] 102  Resp:  [9-34] 16  SpO2:  [80 %-100 %] 92 %  BP: ()/(47-71) 117/58  Arterial Line BP: ()/(30-48) 118/40     Weight: 115 kg (253 lb 8.5 oz)  Body mass index is 33.45 kg/m².    Intake/Output Summary (Last 24 hours) at 7/29/2019 1349  Last data filed at 7/29/2019 1301  Gross per 24 hour   Intake 3307.05 ml   Output 2098 ml   Net 1209.05 ml      Physical Exam   Constitutional: He appears well-developed and well-nourished.   HENT:   Head: Normocephalic and atraumatic.   ETT and OGT in place, poor dentition noted    Eyes: Pupils are equal, round, and reactive to light.   Neck: Normal range of motion. Neck supple.   Right tunneled line noted.  Also a strong vascular pulsation noted at the central base of his neck.   Cardiovascular:   Tachycardic, no murmurs appreciated, right femoral TLC and right chest/neck catheter.    Pulmonary/Chest: Effort normal and breath sounds normal. No respiratory distress.   Abdominal: Soft. He exhibits no distension. There is no tenderness.   Non-distended, non-tender, no apparent bowel sounds.   Musculoskeletal: He exhibits no edema.   Neurological:   PEERL, roving eye movements, withdraws to noxious stimuli in all extremities, moving head slowly from side to side, no response to voice.    Skin: Skin is warm and dry.   Stage I sacral breakdown, stage II ulcer to left shin    Psychiatric: He has a normal  mood and affect. His behavior is normal.   Vitals reviewed.      Significant Labs: All pertinent labs within the past 24 hours have been reviewed.    Significant Imaging: I have reviewed and interpreted all pertinent imaging results/findings within the past 24 hours.

## 2019-07-29 NOTE — HPI
63M with h/o ESRD transferred from [facility] for management of septic shock. Currently intubated and on CRRT. History obtained by chart (records in [facility]) and wife at bedside    Pt had issues with HD catheter and C.diff in April. BCx 4/22 and 4/25 + E.faecalis. Was treated with metronidazole and IV vancomycin (got one week after discharge). Per wife, catheter was not removed at this time.  Re-admitted 7/17- 7/28 to [facility] with weakness. Was found to be anemic. BCx remained positive for E.faecalis on 7/17 and HD catheter was removed. Reportedly had cardiac arrest during removal procedure. Wife says there was some hypoxic brain injury. Subsequently had a.fib and was treated with amiodarone which there was concern was contributing to hepatic failure. Underwent EGD during last hospitalization and a gastric ulcer was apparantly clipped. Pt's wife reports he has never had colonoscopy. She denies that he has other indwelling hardware besides permecath and cardiac stent. Did have TTE at OSH on 7/23 that had moderate TR, mild MR, AI and was a technically difficult study.     Pt in acute hepatic failure with meld 40 and elevated ammonia. Hepatitis acute panel negative. Lactate very elevated. Results of EEG pending. TTE pending. On 2 pressors, which are coming down per nursing. ascities was tapped and wbc 123 (38%). curreuntly on vanc/cefepime. ID consulted for antiboitic recommendations.

## 2019-07-29 NOTE — ASSESSMENT & PLAN NOTE
Had a Dieulafoy's lesion that was intervened on at OSH.  Hgb has been stable  - Monitor Hgb and transfuse for < 7  - Keep PLT > 50 if evidence of bleeding, limited data that supports the use of FFP to correct coagulopathy in cirrhotics

## 2019-07-29 NOTE — ASSESSMENT & PLAN NOTE
-History of COPD and Systolic CHF noted.  -At home is on 2LPM of O2 by NC  -Currently intubated and ventilating well  -ABG noted as above  -CXR shows diffuse interstitial and airspace opacities throughout the bilateral lungs, suggestive of pulmonary edema  -Pulm critical care on board and input appreciated.

## 2019-07-29 NOTE — ASSESSMENT & PLAN NOTE
-Mr. Barker was admitted to inpatient status in the ICU  -Unclear if this is septic shock (due to Enterococcal bacteremia) or cardiogenic after cardiac arrest  -at [facility] his infected line was removed, received line holiday, and new line replaced.    -blood cultures 7/17 at [facility] positive for Vanc sensitive Enterococcus faecalis.  Repeat cultures 7/21 NGTD.  Repeat cultures here on 7/28 NGTD so far  -Continue with vanc and cefepime  -Continue norepinephrine, vasopressin and hydrocortisone for BP support.  -Pulm critical care on board and case discussed today  -Await ID consultation  -Echo reviewed and noted to have improved EF of ~40%.  -Overall poor prognosis.

## 2019-07-29 NOTE — ASSESSMENT & PLAN NOTE
"-Chart from [facility] notes 4-5 minutes of ACLS therapy with ROSC and neurologically intact.  Chart also notes that his cardiologist was not confident that he actually had a cardiac arrest as EKG did not show acute injury and his surgeon stated he had a "thready pulse"  -Patient did complete therapeutic cooling at [facility].  -Echo from 6/10/18 showed EF 40-45% and repeat echo 7/23 showed EF 25-30%.  Echo here shows EF 40% but he is on multiple pressors.    -Not on arb/ace or beta blocker at this time due to profound hypotension.  -Dr. Brown on board and input appreciated.    "

## 2019-07-29 NOTE — ASSESSMENT & PLAN NOTE
-Requred 4 units PRBC at [facility] early in his stay from presumed GI bleeding.  -Had EGD there on 7/19 by Dr. oChen showing dileufoy leasion and gastric ulcer which was clipped  -Continue BID PPI for now.  -No active bleeding presently  -Repeat cbc daily.

## 2019-07-29 NOTE — PLAN OF CARE
Problem: Spiritual Distress Risk or Actual  Goal: Spiritual Wellbeing    Intervention: Promote Spiritual Wellbeing  F - Cristina and Belief: [employer]    I - Importance: Family requested anointing.     C - Community: Pt's wife, son, and best friend are bedside.     A - Address in Care: Introduced and offered pastoral support with reflective listening, grief care, and spiritual assessment. Pt's wife requested anointing. Hospital  is away.  contacted pt's former boyhood parish, [place], and left a message with the  to have a  anoint. The parish is will notify  when  is available.  will continue to follow.

## 2019-07-29 NOTE — SUBJECTIVE & OBJECTIVE
Interval History: No acute events overnight.  Remains unresponsive to voice with profound acidosis and is ventilated on multiple pressors and CRRT has been resumed.  Wife at bedside and all questions answered.    Review of Systems   Unable to perform ROS: Intubated     Objective:     Vital Signs (Most Recent):  Temp: (!) 94.6 °F (34.8 °C) (07/29/19 1645)  Pulse: 94 (07/29/19 1645)  Resp: 16 (07/29/19 1645)  BP: (!) 119/56 (07/29/19 1645)  SpO2: 95 % (07/29/19 1645) Vital Signs (24h Range):  Temp:  [91.1 °F (32.8 °C)-97.4 °F (36.3 °C)] 94.6 °F (34.8 °C)  Pulse:  [] 94  Resp:  [9-34] 16  SpO2:  [80 %-99 %] 95 %  BP: ()/(49-71) 119/56  Arterial Line BP: ()/(38-48) 112/38     Weight: 115 kg (253 lb 8.5 oz)  Body mass index is 33.38 kg/m².    Intake/Output Summary (Last 24 hours) at 7/29/2019 1653  Last data filed at 7/29/2019 1614  Gross per 24 hour   Intake 3980.64 ml   Output 3070 ml   Net 910.64 ml      Physical Exam   Constitutional: He appears well-developed and well-nourished.   HENT:   Head: Normocephalic and atraumatic.   ETT and OGT in place, poor dentition noted    Eyes: Pupils are equal, round, and reactive to light.   Neck: Normal range of motion. Neck supple.   R central line, c/d/i   Cardiovascular:   Tachycardic, no murmurs appreciated, right femoral TLC and right chest/neck catheter.    Pulmonary/Chest: Effort normal and breath sounds normal. No respiratory distress.   Abdominal: Soft. He exhibits no distension. There is no tenderness.   NTND; faint fluid wave   Musculoskeletal: He exhibits no edema.   Fistula arm, nontender   Neurological:    no response to voice.    Skin: Skin is warm and dry.   Stage I sacral breakdown, stage II ulcer to left shin    Psychiatric: He has a normal mood and affect. His behavior is normal.   Vitals reviewed.      Significant Labs: All pertinent labs within the past 24 hours have been reviewed.    Significant Imaging: I have reviewed and interpreted all  pertinent imaging results/findings within the past 24 hours.

## 2019-07-29 NOTE — ASSESSMENT & PLAN NOTE
Pt with severe metabolic acidosis due to lactic acidosis.   - Nephrology following  - on crrt. Will try and pull fluid off today  - Electrolyte management per Renal  - on bicarb gtt  - checking abg q6h

## 2019-07-29 NOTE — HOSPITAL COURSE
Patient transferred to [facility] intensive care unit on 7/28/2019.  Patient presented with evidence of severe shock with multiorgan failure on continuous norepinephrine infusion.  Patient's neurological exam was poor on arrival here due in part to suspected anoxic brain injury following recent cardiac arrest at outside hospital and also in part due to severe metabolic encephalopathy due to infection, renal failure, and liver failure.      Nephrology service consulted and patient started on continuous renal replacement therapy.  Pulmonary/Critical Care specialists also consulted for management of his critical illness including ventilator management and management of his vasopressors.  Cardiology also consulted to treat possible component of heart failure contributing to his shock however Dr. Marquise Zafar (Cardiology) felt that this was not a significant contributor to his shock.  Patient on antibiotic treatment on arrived and antibiotic treatment adjusted as per recommendations by our Infectious Disease specialist Dr. Kimmy Garcia.  Repeat blood cultures have remained negative.    Despite continuous dialysis patient has had persistent metabolic acidosis with consistently markedly elevated lactic acid levels.  Patient also started on continuous sodium bicarbonate infusion.  Despite these measures patient had refractory severe metabolic acidosis.    Electroencephalography also performed which revealed evidence of severe encephalopathy but otherwise evidence of seizure activity.  Patient had progressive worsening neurological decline with worsening shock requiring higher doses of multiple vasopressors to maintain a reasonable mean arterial pressure.  Patient also developed evidence of disseminated intravascular coagulation complicating his shock.  Patient also treated with lactulose to treat component of hepatic encephalopathy due to liver failure.  Patient received platelets and cryoprecipitate to treat  disseminated intravascular coagulation.    Despite further aggressive medical care patient's condition continued to decline and we reached at point were we were not able to maintain a reasonable mean arterial pressure despite the use of three vasopressors and unable to continue with dialysis any futher.  With input from Pulmonary Critical Care and our other subspecialists, it was decided that further aggressive care would not be able to bring about a meaningful recovery.  Following further discussion with Pulmonary Critical Care the patient's family agreed to withdrawal care.  Patient pronounced dead at 12:26 p.m.  Cause of death is septic shock with multiorgan failure due to Enterococcus bacteremia from suspected hemodialysis line infection.

## 2019-07-29 NOTE — PLAN OF CARE
Problem: Ventilator-Induced Lung Injury (Mechanical Ventilation, Invasive)  Goal: Absence of Ventilator-Induced Lung Injury  Outcome: Ongoing (interventions implemented as appropriate)  Patient received on mechanical vent, settings as documented. Sats >88%. Oral care done. ABG done, critical values reported to EICU Dr. Christopher. Will continue to monitor.

## 2019-07-29 NOTE — ASSESSMENT & PLAN NOTE
-Most likely due to shock liver / acute hepatic failure, but could also be affected by amiodarone he received at [facility]  -AST and ALT mildly improved today, but still very elevated.  INR rising and now 3.9  -Abd US at [facility] was notable for nodular liver and ascites and there was suspicion of possible cirrhosis  -Previous labs at [facility] showed a negative acute hepatitis panel and negative HIV.  -S/p diagnostic paracentesis on 7/28.  Not indicative of SBP.  -Continue treatment of shock as above.  -Continue lactulose to help with hyperammonemia  -Repeat labs in AM.  -Check INR daily.

## 2019-07-29 NOTE — ASSESSMENT & PLAN NOTE
Pt with infected permecath that was removed at OSH and BCx positive from April until now for same organism. 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 Vancomycin alone will not be adequate. Plan for 6 weeks. ascities fluid not c/w sbp. Agree with repeating cultures. If repeat cultures are positive, would need line exchange again. Could consider JUDY, if c/w goals of care. Please remove femoral line as soon as other access is obtained given increased risk of infection in this location

## 2019-07-29 NOTE — ASSESSMENT & PLAN NOTE
-He has had low platelets at times since at least 2017  -Platelets severely low but no bleeding at this time.  -Most likely this is due to DIC from shock, but he was on heparin until 7/25 so could be HIT  -I have consulted Dr. Jenkins and serotonin release assay ordered.  -Transfuse platelets if any bleeding or if less than 10,000.  -Avoid anticoagulation for now  -Repeat labs in AM.

## 2019-07-29 NOTE — PLAN OF CARE
Patient is on a ventilator, is not appropriate to complete discharge planning assessment at this time.     LMSW will follow for support.        07/29/19 2413   Discharge Assessment   Assessment Type Discharge Planning Assessment   Assessment information obtained from? Medical Record   Prior to hospitilization cognitive status: Unable to Assess

## 2019-07-29 NOTE — PROGRESS NOTES
"[facility] Hospital Medicine  Progress Note    Patient Name: Virgil Barker  MRN: [id number]  Patient Class: IP- Inpatient   Admission Date: 7/28/2019  Length of Stay: 1 days  Attending Physician: Jasbir Garsia MD  Primary Care Provider: Primary Doctor No        Subjective:     Principal Problem:Shock, unspecified      HPI:  Mr. Barker is a 63 year old man with history of ESRD, chronic systolic chf, chronic hypoxic respiratory failure, coronary artery disease, atrial fibrillation, hyperlipidemia and history of C.diff colitis in May of this year who was transferred from [facility] for evaluation by nephrology for CRRT.  He is intubated and sedated and unable to provide history.  I have obtained history by review of his chart.    Mr. Barker was admitted to [facility] 7/17 with profound anemia, weakness and hypotension.  He was transfused 2 units PRBC and taken for EGD on 7/19 by Dr. Mary Motley.  The EGD showed a dieulafoy lesion and small gastric ulcer which was clipped and biopsied.  The biopsy did not reveal any cancer and was negative for H.pylori.  Additionally, the patient was found to have sepsis due to Enterococcal bacteremia presumed due to a dialysis line infection.  The line was removed and he was given a line holiday.  He was treated with vancomycin and zosyn.  Subsequently he was taken to the OR for placement of a tunneled dialysis catheter.  In the OR he became hypoxic with respiratory distress while in the [place] position.  His chart has conflicting notation regarding cardiac arrest.  Apparently he did receive ACLS therapies for 4-5 minutes and was intubated.  He was responsive and neurologically intact so cooling protocol was not initiated.  Notation by his cardiologist there indicates there was no definitive evidence of cardiac arrest.  His surgeon believed his pulses were thready.  Post "arrest" EKG did not reveal any acute injury pattern.  Post arrest echo " "however did reveal a decrease in EF from 40-45% (6/10/18) to 25-30% (7/23/19).  He required treatment with pressors and was transferred to the ICU there.  In the ICU he received dialysis and his pulmonary edema improved.  He did develop atrial fibrillation with rvr and was started on an amiodarone drip, which was subsequently discontinued when he developed significant transaminitis.  In the 24 hours prior to transfer here, he required more pressor support with vasopressin, norepinephrine, neosynephrine and a bicarb drip.  This morning he was "chewing" on his ETT and disloged it with subsequent hypoxia and worsening hypotension.  A rapid response was called, the tube was repositioned and a fentanyl drip was started and subsequently changed to precedex.  Per his attending notes, he was seen by neurology who are not concerned for brain death.  This morning, pH was noted to be 6.98 and cardiology and nephrology there recommended transfer to a facility where he could receive continuous renal replacement therapy.  At the time of arrival to [facility] he is calmly sedated on a precedex drip with ongoing norepinephrine and vasopressin drips.    Overview/Hospital Course:  No notes on file    Interval History: No acute events overnight.  Remains unresponsive to voice with profound acidosis and is ventilated on multiple pressors and CRRT has been resumed.  Wife at bedside and all questions answered.    Review of Systems   Unable to perform ROS: Intubated     Objective:     Vital Signs (Most Recent):  Temp: (!) 93.9 °F (34.4 °C) (07/29/19 1345)  Pulse: 102 (07/29/19 1345)  Resp: 16 (07/29/19 1345)  BP: (!) 117/58 (07/29/19 1345)  SpO2: (!) 92 % (07/29/19 1345) Vital Signs (24h Range):  Temp:  [91.1 °F (32.8 °C)-97.6 °F (36.4 °C)] 93.9 °F (34.4 °C)  Pulse:  [] 102  Resp:  [9-34] 16  SpO2:  [80 %-100 %] 92 %  BP: ()/(47-71) 117/58  Arterial Line BP: ()/(30-48) 118/40     Weight: 115 kg (253 lb 8.5 oz)  Body " mass index is 33.45 kg/m².    Intake/Output Summary (Last 24 hours) at 7/29/2019 1349  Last data filed at 7/29/2019 1301  Gross per 24 hour   Intake 3307.05 ml   Output 2098 ml   Net 1209.05 ml      Physical Exam   Constitutional: He appears well-developed and well-nourished.   HENT:   Head: Normocephalic and atraumatic.   ETT and OGT in place, poor dentition noted    Eyes: Pupils are equal, round, and reactive to light.   Neck: Normal range of motion. Neck supple.   Right tunneled line noted.  Also a strong vascular pulsation noted at the central base of his neck.   Cardiovascular:   Tachycardic, no murmurs appreciated, right femoral TLC and right chest/neck catheter.    Pulmonary/Chest: Effort normal and breath sounds normal. No respiratory distress.   Abdominal: Soft. He exhibits no distension. There is no tenderness.   Non-distended, non-tender, no apparent bowel sounds.   Musculoskeletal: He exhibits no edema.   Neurological:   PEERL, roving eye movements, withdraws to noxious stimuli in all extremities, moving head slowly from side to side, no response to voice.    Skin: Skin is warm and dry.   Stage I sacral breakdown, stage II ulcer to left shin    Psychiatric: He has a normal mood and affect. His behavior is normal.   Vitals reviewed.      Significant Labs: All pertinent labs within the past 24 hours have been reviewed.    Significant Imaging: I have reviewed and interpreted all pertinent imaging results/findings within the past 24 hours.      Assessment/Plan:      * Shock, unspecified  -Mr. Barker was admitted to inpatient status in the ICU  -Unclear if this is septic shock (due to Enterococcal bacteremia) or cardiogenic after cardiac arrest  -at [facility] his infected line was removed, received line holiday, and new line replaced.    -blood cultures 7/17 at [facility] positive for Vanc sensitive Enterococcus faecalis.  Repeat cultures 7/21 NGTD.  Repeat cultures here on 7/28 NGTD so far  -Continue with vanc and  "cefepime  -Continue norepinephrine, vasopressin and hydrocortisone for BP support.  -Pulm critical care on board and case discussed today  -Await ID consultation  -Echo reviewed and noted to have improved EF of ~40%.  -Overall poor prognosis.    Enterococcal bacteremia  -Tratment as above.      ESRD (end stage renal disease) with Severe Anion Gap Acidosis  -Patient has been receiving dialysis at [facility], but transferred here for profound acidosis and hypotension requiring CRRT  -ABG today shows pH 7.1, pCO2 37.9, pO2 111, HCO3 13.6  -Still with anion-gap acidosis and severe lactic acidosis.  -CRRT stopped due to hypotension overnight but has been resumed today.  -Bicarb drip started today.  -Appreciate assistance from nephrology.    Acute on chronic respiratory failure with hypoxia  -History of COPD and Systolic CHF noted.  -At home is on 2LPM of O2 by NC  -Currently intubated and ventilating well  -ABG noted as above  -CXR shows diffuse interstitial and airspace opacities throughout the bilateral lungs, suggestive of pulmonary edema  -Pulm critical care on board and input appreciated.      Cardiac arrest  -Chart from [facility] notes 4-5 minutes of ACLS therapy with ROSC and neurologically intact.  Chart also notes that his cardiologist was not confident that he actually had a cardiac arrest as EKG did not show acute injury and his surgeon stated he had a "thready pulse"  -Patient did complete therapeutic cooling at [facility].  -Echo from 6/10/18 showed EF 40-45% and repeat echo 7/23 showed EF 25-30%.  Echo here shows EF 40% but he is on multiple pressors.    -Not on arb/ace or beta blocker at this time due to profound hypotension.  -Dr. Brown on board and input appreciated.      Multifactorial encephalopathy  -Patient remains with profound multifactorial encephalopathy  -Likely component of anoxic encephalopathy as well as metabolic encephalopathy due to:  Sepsis, bacteremia, uremia, hepatic encephalopathy and possible " seizure  -He did complete therapeutic cooling after cardiac arrest at [facility].  -Presently off sedation and has roving eye movements, repetitive back and forth movement of head, withdrawal to noxious stimuli in all extremities and no response to voice.  -Continue to treat metabolic abnormalities:  Sepsis, acidosis, uremia and hyperammonemia  -Continue lactulose.  -Await EEG  -Will need to consult neurology in the next day or two if no improvement.    Acute on chronic systolic congestive heart failure  -Echo 6/10/18 showed EF 40-45%  -Echo 7/23/19 showed EF 25-30% with severe pulmonary hypertension  -Echo 7/29 per verbal discussion with Dr. Brown shows EF of 40-45%.  -He is fluid overloaded with pulmonary edema and ascites.  Peripheral edema in legs is improved today.  -Not on ace/arb or beta blocker at this time due to septic shock  -Strict ins and outs and daily weights  -CRRT for gentle fluid removal and correction of acidosis  -Dr. Zafar on board.      Atrial fibrillation  -Was on anticoagulation prior to hospitalization at [facility].    -Was on heparin drip at [facility] until 7/25.  -Presently in afib with moderately elevated rate   -No AC at this time due to recent GI bleeding and severe thrombocytopenia  -Was treated with amiodarone at [facility] but developed severe transaminitis so it was discontinued  -Dr. Brown on board.      Coronary artery disease involving native coronary artery  -history of stent noted  -Holding aspirin, plavix and statin.      Shock liver with possible cirrhosis  -Most likely due to shock liver / acute hepatic failure, but could also be affected by amiodarone he received at [facility]  -AST and ALT mildly improved today, but still very elevated.  INR rising and now 3.9  -Abd US at [facility] was notable for nodular liver and ascites and there was suspicion of possible cirrhosis  -Previous labs at [facility] showed a negative acute hepatitis panel and negative HIV.  -S/p diagnostic paracentesis on 7/28.  Not  indicative of SBP.  -Continue treatment of shock as above.  -Continue lactulose to help with hyperammonemia  -Repeat labs in AM.  -Check INR daily.    Thrombocytopenia  -He has had low platelets at times since at least 2017  -Platelets severely low but no bleeding at this time.  -Most likely this is due to DIC from shock, but he was on heparin until 7/25 so could be HIT  -I have consulted Dr. Jenkins and serotonin release assay ordered.  -Transfuse platelets if any bleeding or if less than 10,000.  -Avoid anticoagulation for now  -Repeat labs in AM.      Goals of care, counseling/discussion  -Patient's overall condition is critical and chances for a meaningful recovery are poor despite very aggressive medical care.  -I have discussed with cardiology, critical care and hematology who are all in agreement.  -I have discussed with his wife and shared that unfortunately, I think his chances to survive and leave the hospital are very low.  -Palliative care nurse consulted and wife has indicated she would like him to be DNR.  Order placed.  -Will continue with aggressive medical care for now.      Type 2 diabetes mellitus  -Check A1c  -Recently was 4.9  -Treat with insulin sliding scale q4h for now.      Debility  -Consult PT/OT when extubated.      Malnutrition of mild degree  -Start tube feeds  -Consult nutrition for recommendations.      Acute blood loss anemia  -Requred 4 units PRBC at [facility] early in his stay from presumed GI bleeding.  -Had EGD there on 7/19 by Dr. Cohen showing dileufoy leasion and gastric ulcer which was clipped  -Continue BID PPI for now.  -No active bleeding presently  -Repeat cbc daily.      VTE Risk Mitigation (From admission, onward)        Ordered     Place sequential compression device  Until discontinued      07/28/19 1458     IP VTE HIGH RISK PATIENT  Once      07/28/19 1458        35 minutes critical care time today.        Jasbir Garsia MD  Department of Hospital Medicine   [facility]

## 2019-07-29 NOTE — HPI
"Mr. Barker is a 63 year old man with history of ESRD, chronic systolic CHF, chronic hypoxic respiratory failure, coronary artery disease, atrial fibrillation, hyperlipidemia and history of C.diff colitis in May of this year who was transferred from [facility] for evaluation by nephrology for CRRT.  Hematology is consulted for thrombocytopenia. Patient is intubated. History is obtained from review of his chart. Briefly he was admitted to [facility] on 7/17 with anemia (Hb 6's) and hypotension. He was transfused 2 units PRBC and taken for EGD on 7/19 by Dr. Mary Motley.  The EGD showed a dieulafoy lesion and small gastric ulcer which was clipped and biopsied.  Additionally, the patient was found to have sepsis due to Enterococcal bacteremia presumed due to a dialysis line infection.  The line was removed and he was treated with vancomycin and zosyn.  Subsequently he was taken to the OR for placement of a tunneled dialysis catheter. The outside hospital records are extremely fragmented. Therefore I am not sure when that happened, but a cardiology consult note regarding this incident was written on 7/19. In the OR he became hypoxic with respiratory distress while in the [place] position.  He was coded and transferred to ICU.  Post arrest echo showed a decrease in EF from 40-45% (6/10/18) to 25-30% (7/23/19).   In the ICU he received dialysis and his pulmonary edema improved.  He did develop atrial fibrillation with rvr and was started on an amiodarone drip, which was subsequently discontinued when he developed significant transaminitis.  In the 24 hours prior to transfer here, he required more pressor support with vasopressin, norepinephrine, neosynephrine and a bicarb drip.  "This morning he was "chewing" on his ETT and disloged it with subsequent hypoxia and worsening hypotension.  A rapid response was called, the tube was repositioned and a fentanyl drip was started and subsequently changed to precedex.  " "Per his attending notes, he was seen by neurology who are not concerned for brain death.  This morning, pH was noted to be 6.98 and cardiology and nephrology there recommended transfer to a facility where he could receive continuous renal replacement therapy.  At the time of arrival to [facility] he is calmly sedated on a precedex drip with ongoing norepinephrine and vasopressin drips."    We are consulted for thrombocytopenia.   Again the records are incomplete. But looks like his plt count was 117 and Hb 8.5 on 7/19.   On 7/25, plt count 116, Hb 9.1, AST 1638, .   On 7/27, plt count 105, Hb 8.6, , .   On 7/28, plt count 112, Hb 7.9, AST >913, ALT 1275  On 7/28 at 5 am, plt count 110, Hb 8.2, AST 3648, ALT 1438  Labs here 7/29: Hb 7.4, plt count 24, AST 4122, alt 1811, Fibrinogen 134, bilirubin 5.3, INR 3.9. Ammonia 174, B12 and folate levels normal  US b/l leg 7/28 neg for DVT    According to note he was on heparin outside, but it is unclear to me when it was started. He has been on vancomycin for sepsis. But unclear to me when it was started     "

## 2019-07-29 NOTE — PROGRESS NOTES
Pharmacokinetic Initial Assessment: IV Vancomycin    Assessment/Plan:    Initiate intravenous vancomycin with loading dose of 3000 mg once followed by a maintenance dose of vancomycin 2250mg IV every 24 hours  Desired empiric serum trough concentration is 15 to 20 mcg/mL.  Draw vancomycin trough level 30 min prior to third dose on 7/30 at approximately 1530   Pt is currently on renal replacement therapy  - CRRT. Dose/frequency may change if renal replacement modality changes    Pharmacy will continue to follow and monitor vancomycin.      Please contact pharmacy at extension [phone] with any questions regarding this assessment.     Thank you for the consult,   Amarilys Blanco     Patient brief summary:  Virgil Barker is a 63 y.o. male initiated on antimicrobial therapy with IV Vancomycin for treatment of suspected bacteremia    Drug Allergies:   Review of patient's allergies indicates:  No Known Allergies    Actual Body Weight:   116 kg    Renal Function:   Estimated Creatinine Clearance: 26.2 mL/min (A) (based on SCr of 3.9 mg/dL (H)).,     Dialysis Method (if applicable):  CRRT    CBC (last 72 hours):  No results for input(s): WHITE BLOOD CELL COUNT, HEMOGLOBIN, HEMATOCRIT, PLATELETS, GRAN%, LYMPH%, MONO%, EOSINOPHIL%, BASOPHIL%, DIFFERENTIAL METHOD in the last 72 hours.    Metabolic Panel (last 72 hours):  Recent Labs   Lab Result Units 07/28/19  1601   Sodium mmol/L 139   Potassium mmol/L 4.8   Chloride mmol/L 98   CO2 mmol/L 11*   Glucose mg/dL 90   BUN, Bld mg/dL 29*   Creatinine mg/dL 3.9*   Albumin g/dL 2.9*   Total Bilirubin mg/dL 3.9*   Alkaline Phosphatase U/L 134   AST U/L 4,507*   ALT U/L 1,790*   Magnesium mg/dL 1.9   Phosphorus mg/dL 7.5*       Drug levels (last 3 results):  Recent Labs   Lab Result Units 07/28/19  1608   Vancomycin, Random ug/mL 13.7       Microbiologic Results:  Microbiology Results (last 7 days)     Procedure Component Value Units Date/Time    Blood culture (site 1) [[id number]]  Collected:  07/28/19 1600    Order Status:  Sent Specimen:  Blood from Peripheral, Foot, Right Updated:  07/28/19 1712    (rule out SBP) Aerobic culture [[id number]] Collected:  07/28/19 1637    Order Status:  Sent Specimen:  Ascites Updated:  07/28/19 1638    (rule out SBP) Culture, Anaerobic [[id number]] Collected:  07/28/19 1637    Order Status:  Sent Specimen:  Ascites Updated:  07/28/19 1638    Blood culture (site 2) [[id number]] Collected:  07/28/19 1600    Order Status:  Sent Specimen:  Blood from Peripheral, Foot, Right Updated:  07/28/19 1617

## 2019-07-29 NOTE — ASSESSMENT & PLAN NOTE
Intubated after his cardiac arrest - remains intubated. CXR showing pulmonary edema. Pt desaturates when laid flat due to pulmonary edema.  - Continue LPV @ 6cc/kg with goal Pplat < 30  - Hold all sedation for now  - will try and pull fluids off with CRRT

## 2019-07-29 NOTE — PLAN OF CARE
Problem: Adult Inpatient Plan of Care  Goal: Plan of Care Review  Outcome: Ongoing (interventions implemented as appropriate)  Pt neuro status unchanged. Pt developed hypotension last night while receiving crrt. 500 bolus given last night with good effect.   crrt stopped around 1020 last night due to air on fluid warmer. Re-started at 0015. Pt is now able to tolerate uf of 100. 0500 received a critical value platelet = 27. eicu md informed and ordered 1u prbc. Blood bank called and informed rn that they will ask their md regarding prbc since patient is not actively bleeding and hgb is >7.  Prn mg replacement given. Will continue to monitor.

## 2019-07-29 NOTE — PROGRESS NOTES
"Nephrology  Progress Note    Admit Date: 7/28/2019   LOS: 1 day     SUBJECTIVE:     Follow-up For:  Shock, unspecified/ESRD    Interval History:     Events noted over last night.  Remained hypotensive despite maxed out pressors.  Given saline boluses and packed RBCs with minimal improvement.  Remains vented.  Labs noted.  Extensive discussion with wife at bedside this morning.  Critical care team updated. CRRT in progress.     Review of Systems:   unable.     OBJECTIVE:     Vital Signs Range (Last 24H):  /60   Pulse 104   Temp 96 °F (35.6 °C) (Axillary)   Resp (!) 25   Ht 6' 1" (1.854 m)   Wt 115 kg (253 lb 8.5 oz)   SpO2 95%   BMI 33.45 kg/m²     Temp:  [91.1 °F (32.8 °C)-97.6 °F (36.4 °C)]   Pulse:  []   Resp:  [9-34]   BP: ()/(45-71)   SpO2:  [80 %-100 %]   Arterial Line BP: ()/(30-48)     I & O (Last 24H):    Intake/Output Summary (Last 24 hours) at 7/29/2019 0850  Last data filed at 7/29/2019 0800  Gross per 24 hour   Intake 2229.61 ml   Output 854 ml   Net 1375.61 ml       Physical Exam:  General appearance: critically ill.    Eyes:  Sluggish.  Mouth:  Dry blood noted.   Lungs: vented/diminished.   Heart: tachy-Irregular rate and rhythm, S1, S2 normal, no murmur, rub or kevin.  Abdomen: semi-firm, distended-ascites,  bowel sounds minimal; OGT.  Extremities: cold extremities.  Worsening dep edema.    Skin: cool to touch.   Neurologic: unable to fully assess.   Right IJ SHARRI. LUE AVF with palpable thrill      Laboratory Data:  Recent Labs   Lab 07/29/19  0410   WBC 8.99   RBC 2.39*   HGB 7.8*   HCT 26.6*   PLT 27*   *   MCH 32.6*   MCHC 29.3*       BMP:   Recent Labs   Lab 07/29/19  0410   GLU 82  82   *  135*   K 4.3  4.3     100   CO2 11*  11*   BUN 28*  28*   CREATININE 3.4*  3.4*   CALCIUM 7.9*  7.9*   MG 1.7     Lab Results   Component Value Date    CALCIUM 7.9 (L) 07/29/2019    CALCIUM 7.9 (L) 07/29/2019    PHOS 5.5 (H) 07/29/2019    PHOS 5.5 (H) " 07/29/2019       Lab Results   Component Value Date    CALCIUM 7.9 (L) 07/29/2019    CALCIUM 7.9 (L) 07/29/2019    PHOS 5.5 (H) 07/29/2019    PHOS 5.5 (H) 07/29/2019     Recent Labs   Lab 07/29/19  0410   ALT 1,811*   AST 4,122*   ALKPHOS 146*   BILITOT 5.4*   PROT 7.7   ALBUMIN 3.0*  3.0*       No results found for: URICACID    BNP  Recent Labs   Lab 07/28/19  1601   BNP >4,900*       Medications:  Medication list was reviewed and changes noted under Assessment/Plan.    Diagnostic Results:        ASSESSMENT/PLAN:        ESRD on HD with hypertensive heart and acute on chronic systolic HF  - Two Physician emergency consent obtained.    - initiated CRRT for volume removal and metabolic clearance, but unable to pull with degree of shock.   - Surgeon Dr. Deysi Cantu placed LUE AVF last month.  -continue with CRRT for now.   -Renally dose meds, avoid nephrotoxins, and monitor I/O's closely.    Worsening AGMA/Lactic Acidosis with PH 7.1:  -restart bicarb drip.  -CRRT as now.      Septic shock due to Enterococcus bacteremia  - BCx from [facility] reviewed and susceptible to Vanc, Ampicillin, Gent  - Pressors to maintain MAP>60.     Pulmonary HTN, afib with RVR  - Followed as outpt by Dr. Jose Bernstein.     Cardiac arrest  - Will need Neuro input on brain activity as he has completed hypothermia protocol at [facility].  -EEG today.     Ischemic hepatitis/>NH3  - Due to shock.  -defer    Multifactorial anemia and thrombocytopenia:  -trends noted.   -consumptive vs early DIC.        Dispo: Grim prognosis. Updated wife in detail.

## 2019-07-29 NOTE — CONSULTS
Palliative Care Progress Note:      Code Status  In light of the patients advanced and terminal illness, we reviewed what the patients preferences for care would be at the very end of life.  The patient wishes to have a natural, peaceful death.  Along those lines, the patient does not wish to have CPR or other invasive treatments performed when his heart and/or breathing stops.  I communicated to the patient that a DNR order would be placed in his medical record to reflect this preference.  A DNR form was completed and will be scanned into EPIC.  In addition, a [place] form was completed to reflect other EOL preferences of the patient such as selective treatment until other family member have a chance to see Pt and no artificial nuitrition.  I spent a total of 25 minutes engaging the patient in this advance care planning discussion.

## 2019-07-29 NOTE — ASSESSMENT & PLAN NOTE
Looking at care everywhere, e. Faecalis grew at [facility]. Was pan-sensitive (not VRE)  - continue vanc  - repeat blood cultures so far negative

## 2019-07-29 NOTE — SUBJECTIVE & OBJECTIVE
Interval History: Pt severely sick. Has been off all sedatives and still not waking up to follow commands. He does move spontaneously, has reflexes and pupils are reactive so he's not brain dead. In intubated and remains on double pressers.    Objective:     Vital Signs (Most Recent):  Temp: 96 °F (35.6 °C) (07/29/19 0715)  Pulse: 104 (07/29/19 1000)  Resp: 18 (07/29/19 1000)  BP: (!) 121/58 (07/29/19 1000)  SpO2: 98 % (07/29/19 1000) Vital Signs (24h Range):  Temp:  [91.1 °F (32.8 °C)-97.6 °F (36.4 °C)] 96 °F (35.6 °C)  Pulse:  [] 104  Resp:  [9-34] 18  SpO2:  [80 %-100 %] 98 %  BP: ()/(47-71) 121/58  Arterial Line BP: ()/(30-48) 136/46     Weight: 115 kg (253 lb 8.5 oz)  Body mass index is 33.45 kg/m².      Intake/Output Summary (Last 24 hours) at 7/29/2019 1017  Last data filed at 7/29/2019 1000  Gross per 24 hour   Intake 2426.01 ml   Output 1215 ml   Net 1211.01 ml       Physical Exam   Constitutional: He appears well-nourished.   Sick appearing male   HENT:   Head: Normocephalic and atraumatic.   Right Ear: External ear normal.   Left Ear: External ear normal.   Eyes: Pupils are equal, round, and reactive to light. Conjunctivae are normal. Right eye exhibits no discharge. Left eye exhibits no discharge.   Neck: Neck supple. No thyromegaly present.   Cardiovascular: Normal rate and regular rhythm. Exam reveals no friction rub.   No murmur heard.  Pulmonary/Chest: Effort normal. No stridor. No respiratory distress. He has no wheezes. He has no rales.   Abdominal: Soft. He exhibits distension.   Significant amount of ascites   Musculoskeletal: He exhibits edema.   Neurological:   Doesn't follow commands. Moves extremities spontaneously. Withdraws to pain. Pupils reactive to light.   Skin: Capillary refill takes less than 2 seconds. He is not diaphoretic.   Mottled skin of distal extremities.       Vents:  Vent Mode: VC-AC (07/29/19 3992)  Ventilator Initiated: Yes (07/28/19 2629)  Set Rate: 26  bmp (07/29/19 0855)  Vt Set: 450 mL (07/29/19 0855)  PEEP/CPAP: 5 cmH20 (07/29/19 0855)  Oxygen Concentration (%): 40 (07/29/19 1000)  Peak Airway Pressure: 17 cmH2O (07/29/19 0855)  Total Ve: 13.5 mL (07/29/19 0855)  F/VT Ratio<105 (RSBI): 169.31 (07/29/19 0852)    Lines/Drains/Airways     Central Venous Catheter Line                 Hemodialysis Catheter 07/23/19 right subclavian 6 days         Percutaneous Central Line Insertion/Assessment - triple lumen  07/23/19 right femoral vein;right femoral 6 days          Drain                 NG/OG Tube orogastric Center mouth -- days         Hemodialysis AV Fistula 06/20/19 Left upper arm 39 days          Airway                 Airway - Non-Surgical 07/28/19 Endotracheal Tube 1 day          Arterial Line                 Arterial Line 07/23/19 Right Radial 6 days                Significant Labs:    CBC/Anemia Profile:  Recent Labs   Lab 07/29/19  0410   WBC 8.99   HGB 7.8*   HCT 26.6*   PLT 27*   *   RDW 21.8*        Chemistries:  Recent Labs   Lab 07/28/19  1601 07/28/19  2143 07/28/19  2347 07/29/19  0410 07/29/19  0846    135*  --  135*  135*  --    K 4.8 4.1  --  4.3  4.3  --    CL 98 99  --  100  100  --    CO2 11* 11*  --  11*  11*  --    BUN 29* 30*  --  28*  28*  --    CREATININE 3.9* 3.6*  --  3.4*  3.4*  --    CALCIUM 8.2* 7.6*  --  7.9*  7.9*  --    ALBUMIN 2.9* 2.8*  --  3.0*  3.0*  --    PROT 7.6  --   --  7.7  --    BILITOT 3.9*  --   --  5.4*  --    ALKPHOS 134  --   --  146*  --    ALT 1,790*  --   --  1,811*  --    AST 4,507*  --   --  4,122*  --    MG 1.9  --  1.6 1.7 2.2   PHOS 7.5* 5.8*  --  5.5*  5.5*  --        ABGs:   Recent Labs   Lab 07/29/19  0814   PH 7.151*   PCO2 33.8*   HCO3 11.8*   POCSATURATED 97   BE -17     CMP:   Recent Labs   Lab 07/28/19  1601 07/28/19  2143 07/29/19  0410    135* 135*  135*   K 4.8 4.1 4.3  4.3   CL 98 99 100  100   CO2 11* 11* 11*  11*   GLU 90 152* 82  82   BUN 29* 30* 28*  28*    CREATININE 3.9* 3.6* 3.4*  3.4*   CALCIUM 8.2* 7.6* 7.9*  7.9*   PROT 7.6  --  7.7   ALBUMIN 2.9* 2.8* 3.0*  3.0*   BILITOT 3.9*  --  5.4*   ALKPHOS 134  --  146*   AST 4,507*  --  4,122*   ALT 1,790*  --  1,811*   ANIONGAP 30* 25* 24*  24*   EGFRNONAA 15* 17* 18*  18*     Lactic Acid:   Recent Labs   Lab 07/28/19  1601 07/29/19  0846   LACTATE >12.0* >12.0*     All pertinent labs within the past 24 hours have been reviewed.    Significant Imaging:  I have reviewed and interpreted all pertinent imaging results/findings within the past 24 hours.

## 2019-07-29 NOTE — CONSULTS
[facility]  Hematology/Oncology  Consult Note    Patient Name: Virgil Barker  MRN: [id number]  Admission Date: 7/28/2019  Hospital Length of Stay: 1 days  Code Status: DNR   Attending Provider: Jasbir Garsia MD  Consulting Provider: Jian Jenkins MD  Primary Care Physician: Primary Doctor No  Principal Problem:Shock, unspecified    Inpatient consult to Hematology/Oncology  Consult performed by: Jian Jenkins MD  Consult ordered by: Jasbir Garsia MD  Reason for consult: severe thrombocytopenia  Assessment/Recommendations: See consult note        Subjective:     HPI:  Mr. Barker is a 63 year old man with history of ESRD, chronic systolic CHF, chronic hypoxic respiratory failure, coronary artery disease, atrial fibrillation, hyperlipidemia and history of C.diff colitis in May of this year who was transferred from [facility] for evaluation by nephrology for CRRT.   Hematology is consulted for thrombocytopenia. Patient is intubated. History is obtained from review of his chart. Briefly he was admitted to [facility] on 7/17 with anemia (Hb 6's) and hypotension. He was transfused 2 units PRBC and taken for EGD on 7/19 by Dr. Mary Motley.  The EGD showed a dieulafoy lesion and small gastric ulcer which was clipped and biopsied.  Additionally, the patient was found to have sepsis due to Enterococcal bacteremia presumed due to a dialysis line infection.  The line was removed and he was treated with vancomycin and zosyn.  Subsequently he was taken to the OR for placement of a tunneled dialysis catheter. The outside hospital records are extremely fragmented. Therefore I am not sure when that happened, but a cardiology consult note regarding this incident was written on 7/19. In the OR he became hypoxic with respiratory distress while in the [place] position.   He was coded and transferred to ICU.  Post arrest echo showed a decrease in EF from 40-45% (6/10/18) to 25-30% (7/23/19).   In the ICU  "he received dialysis and his pulmonary edema improved.  He did develop atrial fibrillation with rvr and was started on an amiodarone drip, which was subsequently discontinued when he developed significant transaminitis.  In the 24 hours prior to transfer here, he required more pressor support with vasopressin, norepinephrine, neosynephrine and a bicarb drip.   "This morning he was "chewing" on his ETT and disloged it with subsequent hypoxia and worsening hypotension.  A rapid response was called, the tube was repositioned and a fentanyl drip was started and subsequently changed to precedex.  Per his attending notes, he was seen by neurology who are not concerned for brain death.  This morning, pH was noted to be 6.98 and cardiology and nephrology there recommended transfer to a facility where he could receive continuous renal replacement therapy.  At the time of arrival to [facility] he is calmly sedated on a precedex drip with ongoing norepinephrine and vasopressin drips."    We are consulted for thrombocytopenia.   Again the records are incomplete. But looks like his plt count was 117 and Hb 8.5 on 7/19.   On 7/25, plt count 116, Hb 9.1, AST 1638, .   On 7/27, plt count 105, Hb 8.6, , .   On 7/28, plt count 112, Hb 7.9, AST >913, ALT 1275  On 7/28 at 5 am, plt count 110, Hb 8.2, AST 3648, ALT 1438  Labs here 7/29: Hb 7.4, plt count 24, AST 4122, alt 1811, Fibrinogen 134, bilirubin 5.3, INR 3.9. Ammonia 174, B12 and folate levels normal  US b/l leg 7/28 neg for DVT    According to note he was on heparin outside, but it is unclear to me when it was started. He has been on vancomycin for sepsis. But unclear to me when it was started       No new subjective & objective note has been filed under this hospital service since the last note was generated.    Assessment/Plan:     Thrombocytopenia  - looks like his plt count before he developed shock liver on 7/28 was slightly low around 100-110s " "range. However after he developed shock liver on 7/28, his plt count acutely dropped from 110 yesterday morning to 27 today  - peripheral smear reviewed. Marked echinocytes (disformed RBCs) which can be seen in severe liver disease  - fibrinogen low, may have some low grade DIC.   - likely multifactorial.  The timing of the plt count drop is most suggestive of liver injury as the main cause.   - need CT abdomen/pelvis to r/o intraabdominal hematoma  - check fibrinogen daily. Transfuse cryo for fibrinogen<100  - transfuse for plt count <20k in the absence of bleeding, <50k in the presence of bleeding    DIC (disseminated intravascular coagulation)  - see "thrombocytopenia"        Thank you for your consult. I will follow-up with patient. Please contact us if you have any additional questions.    Jian Jenkins MD  Hematology/Oncology  [facility]  "

## 2019-07-29 NOTE — ASSESSMENT & PLAN NOTE
Pt in multiorgan failure. Suspect combination of septic shock and cardiogenic shock (mottled skin). Pt had Enterobacter Faecalis grow at [facility] (but is sensitive to vanc and has been on vanc). EF from 7/24/19 showed EF 25% (previously 40-45%). This could be stunned myocardium s/p cardiac arrest. He also presented with GIB but his Hgb at OSH has been relatively stable (so not hemorrhagic shock).  - On Levo/Vaso. Will change goal to SBP >90.  - currently on stress dose steroids (hydrocortisone 100mg q8h)  - Spoke with nephrology about Volume management with CRRT (pt with severe pulm HTN, ascites, anasarca, HF)  - Lactate remains > 12. Will trend q6h  - s/p small volume paracentesis yesterday. Ascites fluid consistent with portal htn, not SBP  - continue vanc/cefepime

## 2019-07-29 NOTE — ASSESSMENT & PLAN NOTE
-Patient's overall condition is critical and chances for a meaningful recovery are poor despite very aggressive medical care.  -I have discussed with cardiology, critical care and hematology who are all in agreement.  -I have discussed with his wife and shared that unfortunately, I think his chances to survive and leave the hospital are very low.  -Palliative care nurse consulted and wife has indicated she would like him to be DNR.  Order placed.  -Will continue with aggressive medical care for now.

## 2019-07-29 NOTE — CONSULTS
[facility]  Infectious Disease  Consult Note    Patient Name: Virgil Barker  MRN: [id number]  Admission Date: 7/28/2019  Hospital Length of Stay: 1 days  Attending Physician: Jasbir Garsia MD  Primary Care Provider: Primary Doctor No     Isolation Status: No active isolations    Patient information was obtained from patient and ER records.      Inpatient consult to Infectious Diseases  Consult performed by: Kimmy Garcia MD  Consult ordered by: Jasbir Garsia MD        Assessment/Plan:     Enterococcal bacteremia  Pt with infected permecath that was removed at OSH and BCx positive from April until now for same organism. 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 Vancomycin alone will not be adequate. Plan for 6 weeks. ascities fluid not c/w sbp. Agree with repeating cultures. If repeat cultures are positive, would need line exchange again. Could consider JUDY, if c/w goals of care. Please remove femoral line as soon as other access is obtained given increased risk of infection in this location        Thank you for your consult. I will follow-up with patient. Please contact us if you have any additional questions.    Kimmy Garcia MD  Infectious Disease  [facility]    Subjective:     Principal Problem: Shock, unspecified    HPI: 63M with h/o ESRD transferred from [facility] for management of septic shock. Currently intubated and on CRRT. History obtained by chart (records in CareEverywhere) and wife at bedside    Pt had issues with HD catheter and C.diff in April. BCx 4/22 and 4/25 + E.faecalis. Was treated with metronidazole and IV vancomycin (got one week after discharge). Per wife, catheter was not removed at this time.  Re-admitted 7/17- 7/28 to [facility] with weakness. Was found to be anemic. BCx remained positive for E.faecalis on 7/17 and HD catheter was removed. Reportedly had cardiac arrest during  removal procedure. Wife says there was some hypoxic brain injury. Subsequently had a.fib and was treated with amiodarone which there was concern was contributing to hepatic failure. Underwent EGD during last hospitalization and a gastric ulcer was apparantly clipped. Pt's wife reports he has never had colonoscopy. She denies that he has other indwelling hardware besides permecath and cardiac stent. Did have TTE at OSH on 7/23 that had moderate TR, mild MR, AI and was a technically difficult study.     Pt in acute hepatic failure with meld 40 and elevated ammonia. Hepatitis acute panel negative. Lactate very elevated. Results of EEG pending. TTE pending. On 2 pressors, which are coming down per nursing. ascities was tapped and wbc 123 (38%). curreuntly on vanc/cefepime. ID consulted for antiboitic recommendations.     Interval History: No acute events overnight.  Remains unresponsive to voice with profound acidosis and is ventilated on multiple pressors and CRRT has been resumed.  Wife at bedside and all questions answered.    Review of Systems   Unable to perform ROS: Intubated     Objective:     Vital Signs (Most Recent):  Temp: (!) 94.6 °F (34.8 °C) (07/29/19 1645)  Pulse: 94 (07/29/19 1645)  Resp: 16 (07/29/19 1645)  BP: (!) 119/56 (07/29/19 1645)  SpO2: 95 % (07/29/19 1645) Vital Signs (24h Range):  Temp:  [91.1 °F (32.8 °C)-97.4 °F (36.3 °C)] 94.6 °F (34.8 °C)  Pulse:  [] 94  Resp:  [9-34] 16  SpO2:  [80 %-99 %] 95 %  BP: ()/(49-71) 119/56  Arterial Line BP: ()/(38-48) 112/38     Weight: 115 kg (253 lb 8.5 oz)  Body mass index is 33.38 kg/m².    Intake/Output Summary (Last 24 hours) at 7/29/2019 1653  Last data filed at 7/29/2019 1614  Gross per 24 hour   Intake 3980.64 ml   Output 3070 ml   Net 910.64 ml      Physical Exam   Constitutional: He appears well-developed and well-nourished.   HENT:   Head: Normocephalic and atraumatic.   ETT and OGT in place, poor dentition noted    Eyes: Pupils  are equal, round, and reactive to light.   Neck: Normal range of motion. Neck supple.   R central line, c/d/i   Cardiovascular:   Tachycardic, no murmurs appreciated, right femoral TLC and right chest/neck catheter.    Pulmonary/Chest: Effort normal and breath sounds normal. No respiratory distress.   Abdominal: Soft. He exhibits no distension. There is no tenderness.   NTND; faint fluid wave   Musculoskeletal: He exhibits no edema.   Fistula arm, nontender   Neurological:    no response to voice.    Skin: Skin is warm and dry.   Stage I sacral breakdown, stage II ulcer to left shin    Psychiatric: He has a normal mood and affect. His behavior is normal.   Vitals reviewed.      Significant Labs: All pertinent labs within the past 24 hours have been reviewed.    Significant Imaging: I have reviewed and interpreted all pertinent imaging results/findings within the past 24 hours.

## 2019-07-29 NOTE — ASSESSMENT & PLAN NOTE
-Was on anticoagulation prior to hospitalization at [facility].    -Was on heparin drip at [facility] until 7/25.  -Presently in afib with moderately elevated rate   -No AC at this time due to recent GI bleeding and severe thrombocytopenia  -Was treated with amiodarone at [facility] but developed severe transaminitis so it was discontinued  -Dr. Brown on board.

## 2019-07-29 NOTE — PROCEDURES
DATE OF STUDY:  07/29/2019    EEG NUMBER:  OB-.    REFERRING PHYSICIAN:  Dr. Contreras.    This EEG was performed to assess for status epilepticus.    ELECTROENCEPHALOGRAM REPORT    METHODOLOGY:  Electroencephalographic (EEG) recording is recorded with   electrodes placed according to the International 10-20 placement system.  Thirty   two (32) channels of digital signal (sampling rate of 512/sec), including T1   and T2, were simultaneously recorded from the scalp and may include EKG, EMG,   and/or eye monitors.  Recording band pass was 0.1 to 512 Hz.  Digital video   recording of the patient is simultaneously recorded with the EEG.  The patient   is instructed to report clinical symptoms which may occur during the recording   session.  EEG and video recording are stored and archived in digital format.    Activation procedures, which include photic stimulation, hyperventilation and   instructing patients to perform simple tasks, are done in selected patients.    The EEG is displayed on a monitor screen and can be reviewed using different   montages.  Computer assisted-analysis is employed to detect spike and   electrographic seizure activity.  The entire record is submitted for computer   analysis.  The entire recording is visually reviewed, and the times identified   by computer analysis as being spikes or seizures are reviewed again.    Compressed spectral analysis (CSA) is also performed on the activity recorded   from each individual channel.  This is displayed as a power display of   frequencies from 0 to 30 Hz over time.  The CSA is reviewed looking for   asymmetries in power between homologous areas of the scalp, then compared with   the original EEG recording.    Stratus5 software was also utilized in the review of this study.  This software   suite analyzes the EEG recording in multiple domains.  Coherence and rhythmicity   are computed to identify EEG sections which may contain organized seizures.     Each channel undergoes analysis to detect the presence of spike and sharp waves   which have special and morphological characteristics of epileptic activity.  The   routine EEG recording is converted from special into frequency domain.  This is   then displayed comparing homologous areas to identify areas of significant   asymmetry.  Algorithm to identify non-cortically generated artifact is used to   separate artifact from the EEG.    EEG FINDINGS:  The recording was obtained with a number of standard bipolar and   referential montages during obtunded state.  In this state, the background was   diffusely disorganized and comprised of an admixture of delta range frequencies   with overriding theta range frequencies, which were symmetric.  Spontaneous   variability and reactivity were noted.  During deeper stages of clinical sleep,   symmetric sleep spindles were noted.  There were no interictal epileptiform   abnormalities and no clinical or electrographic seizures were recorded.    The EKG channel revealed sinus rhythm.    IMPRESSION:  This is an abnormal EEG during obtunded state.  Diffuse   disorganized slowing of the background was noted.    CLINICAL CORRELATION:  The patient is a 63-year-old male with a history of   hypoxia and respiratory distress.  The patient is currently not maintained on   any anti-seizure medications.  This is an abnormal EEG during obtunded state.    Diffuse disorganized slowing of the background is suggestive of a   moderate-to-severe encephalopathy, nonspecific to the cause.  There is no   evidence of an epileptic process on this recording.  No seizures were recorded   during this study.      FAK/IN  dd: 07/29/2019 17:33:27 (CDT)  td: 07/29/2019 17:44:35 (CDT)  Doc ID   #[id number]  Job ID #[id number]    CC:

## 2019-07-29 NOTE — ASSESSMENT & PLAN NOTE
-Patient has been receiving dialysis at [facility], but transferred here for profound acidosis and hypotension requiring CRRT  -ABG today shows pH 7.1, pCO2 37.9, pO2 111, HCO3 13.6  -Still with anion-gap acidosis and severe lactic acidosis.  -CRRT stopped due to hypotension overnight but has been resumed today.  -Bicarb drip started today.  -Appreciate assistance from nephrology.

## 2019-07-29 NOTE — ASSESSMENT & PLAN NOTE
-Echo 6/10/18 showed EF 40-45%  -Echo 7/23/19 showed EF 25-30% with severe pulmonary hypertension  -Echo 7/29 per verbal discussion with Dr. Brown shows EF of 40-45%.  -He is fluid overloaded with pulmonary edema and ascites.  Peripheral edema in legs is improved today.  -Not on ace/arb or beta blocker at this time due to septic shock  -Strict ins and outs and daily weights  -CRRT for gentle fluid removal and correction of acidosis  -Dr. Zafar on board.

## 2019-07-29 NOTE — CONSULTS
Palliative Care Progress Note:    Spoke to Nikolay Bella to relay family request for [place] visit. Family is Taoist by alex and would like [place] to arrange blessing. 2 rosaries brought to BS for PT and family to be able to pray with. Jena reports Nikolay Adhikari will be notfied of request.

## 2019-07-29 NOTE — PLAN OF CARE
Problem: Spiritual Distress Risk or Actual  Goal: Spiritual Wellbeing    Intervention: Promote Spiritual Wellbeing  Accompanied Fr. Green from [facility]. He offered Sacrament of the Sick (Anointing) for the patient. Pt's wife and son present.

## 2019-07-29 NOTE — EICU
New admit, notified of hypotension    62 y/o M with history of HFrEF 40%, CAD s/p stents, afib, ESRD on HD initially admitted at an outside facility on 7/17 for GI bleed requiring transfusion of 3 units PRBC secondary to Dieulafoy lesion seen on EGD.  He also developed VRE bacteremia and had tunneled catheter removed with a line holiday and went to the OR on 7/23 to have a new one placed.  In the OR it was unclear if he lost a pulse but did receive CPR. He remained on vasopressors after the event.  He went into afib RVR and was given amiodarone but was discontinued when he was noted have elevated liver enzymes. Abdominal US showed cirrhosis with ascites. He was transferred to [facility] for CRRT.    Camera assessment:  Not synchronized on the vent  , RR 26 I:E 1:2, FiO2 40% PEEP 5, I Time 1  MV 10-11    Telemetry:  BP 84/51   O2 96% on norepinephrine     Data:  WBC 13.8, Hgb 8.2, platelets 67  PT 34.6, INR 3.1  Na 139, K 4.8, BUN 29, creatinine 3.9  AST 4507, ALT 1790  TB 3.9  Ammonia 204  Lactate >12  CXR diffuse airspace opacities    · Zero CRRT for now and give a trial of 500 cc NS bolus  · If persistently hypotensive, start cricket-synephrine  · Will give a one time dose of lactulose for now given hypotension        Notified of platelets 27, H/H 7.8/26.6  No report of patient being given heparin. Unclear if patient given Linezolid for VRE that could potentially cause thrombocytopenia  No obvious source of bleeding    · Ordered to transfuse 1 unit PRBC as patient still on 2 pressors  · Check fibrinogen level  · Retrieve culture results from [facility] to confirm vancomycin resistance as risk for empirically placing patient on linezolid is high given thrombocytopenia

## 2019-07-29 NOTE — PROGRESS NOTES
[facility]  Pulmonology  Progress Note    Patient Name: Virgil Barker  MRN: [id number]  Admission Date: 7/28/2019  Hospital Length of Stay: 1 days  Code Status: Full Code  Attending Provider: Jasbir Garsia MD  Primary Care Provider: Primary Doctor No   Principal Problem: Shock, unspecified    Subjective:     Interval History: Pt severely sick. Has been off all sedatives and still not waking up to follow commands. He does move spontaneously, has reflexes and pupils are reactive so he's not brain dead. In intubated and remains on double pressers.    Objective:     Vital Signs (Most Recent):  Temp: 96 °F (35.6 °C) (07/29/19 0715)  Pulse: 104 (07/29/19 1000)  Resp: 18 (07/29/19 1000)  BP: (!) 121/58 (07/29/19 1000)  SpO2: 98 % (07/29/19 1000) Vital Signs (24h Range):  Temp:  [91.1 °F (32.8 °C)-97.6 °F (36.4 °C)] 96 °F (35.6 °C)  Pulse:  [] 104  Resp:  [9-34] 18  SpO2:  [80 %-100 %] 98 %  BP: ()/(47-71) 121/58  Arterial Line BP: ()/(30-48) 136/46     Weight: 115 kg (253 lb 8.5 oz)  Body mass index is 33.45 kg/m².      Intake/Output Summary (Last 24 hours) at 7/29/2019 1017  Last data filed at 7/29/2019 1000  Gross per 24 hour   Intake 2426.01 ml   Output 1215 ml   Net 1211.01 ml       Physical Exam   Constitutional: He appears well-nourished.   Sick appearing male   HENT:   Head: Normocephalic and atraumatic.   Right Ear: External ear normal.   Left Ear: External ear normal.   Eyes: Pupils are equal, round, and reactive to light. Conjunctivae are normal. Right eye exhibits no discharge. Left eye exhibits no discharge.   Neck: Neck supple. No thyromegaly present.   Cardiovascular: Normal rate and regular rhythm. Exam reveals no friction rub.   No murmur heard.  Pulmonary/Chest: Effort normal. No stridor. No respiratory distress. He has no wheezes. He has no rales.   Abdominal: Soft. He exhibits distension.   Significant amount of ascites   Musculoskeletal: He exhibits edema.    Neurological:   Doesn't follow commands. Moves extremities spontaneously. Withdraws to pain. Pupils reactive to light.   Skin: Capillary refill takes less than 2 seconds. He is not diaphoretic.   Mottled skin of distal extremities.       Vents:  Vent Mode: VC-AC (07/29/19 0855)  Ventilator Initiated: Yes (07/28/19 1451)  Set Rate: 26 bmp (07/29/19 0855)  Vt Set: 450 mL (07/29/19 0855)  PEEP/CPAP: 5 cmH20 (07/29/19 0855)  Oxygen Concentration (%): 40 (07/29/19 1000)  Peak Airway Pressure: 17 cmH2O (07/29/19 0855)  Total Ve: 13.5 mL (07/29/19 0855)  F/VT Ratio<105 (RSBI): 169.31 (07/29/19 0852)    Lines/Drains/Airways     Central Venous Catheter Line                 Hemodialysis Catheter 07/23/19 right subclavian 6 days         Percutaneous Central Line Insertion/Assessment - triple lumen  07/23/19 right femoral vein;right femoral 6 days          Drain                 NG/OG Tube orogastric Center mouth -- days         Hemodialysis AV Fistula 06/20/19 Left upper arm 39 days          Airway                 Airway - Non-Surgical 07/28/19 Endotracheal Tube 1 day          Arterial Line                 Arterial Line 07/23/19 Right Radial 6 days                Significant Labs:    CBC/Anemia Profile:  Recent Labs   Lab 07/29/19  0410   WBC 8.99   HGB 7.8*   HCT 26.6*   PLT 27*   *   RDW 21.8*        Chemistries:  Recent Labs   Lab 07/28/19  1601 07/28/19  2143 07/28/19  2347 07/29/19  0410 07/29/19  0846    135*  --  135*  135*  --    K 4.8 4.1  --  4.3  4.3  --    CL 98 99  --  100  100  --    CO2 11* 11*  --  11*  11*  --    BUN 29* 30*  --  28*  28*  --    CREATININE 3.9* 3.6*  --  3.4*  3.4*  --    CALCIUM 8.2* 7.6*  --  7.9*  7.9*  --    ALBUMIN 2.9* 2.8*  --  3.0*  3.0*  --    PROT 7.6  --   --  7.7  --    BILITOT 3.9*  --   --  5.4*  --    ALKPHOS 134  --   --  146*  --    ALT 1,790*  --   --  1,811*  --    AST 4,507*  --   --  4,122*  --    MG 1.9  --  1.6 1.7 2.2   PHOS 7.5* 5.8*  --  5.5*   5.5*  --        ABGs:   Recent Labs   Lab 07/29/19  0814   PH 7.151*   PCO2 33.8*   HCO3 11.8*   POCSATURATED 97   BE -17     CMP:   Recent Labs   Lab 07/28/19  1601 07/28/19  2143 07/29/19  0410    135* 135*  135*   K 4.8 4.1 4.3  4.3   CL 98 99 100  100   CO2 11* 11* 11*  11*   GLU 90 152* 82  82   BUN 29* 30* 28*  28*   CREATININE 3.9* 3.6* 3.4*  3.4*   CALCIUM 8.2* 7.6* 7.9*  7.9*   PROT 7.6  --  7.7   ALBUMIN 2.9* 2.8* 3.0*  3.0*   BILITOT 3.9*  --  5.4*   ALKPHOS 134  --  146*   AST 4,507*  --  4,122*   ALT 1,790*  --  1,811*   ANIONGAP 30* 25* 24*  24*   EGFRNONAA 15* 17* 18*  18*     Lactic Acid:   Recent Labs   Lab 07/28/19  1601 07/29/19  0846   LACTATE >12.0* >12.0*     All pertinent labs within the past 24 hours have been reviewed.    Significant Imaging:  I have reviewed and interpreted all pertinent imaging results/findings within the past 24 hours.    Assessment/Plan:     * Shock, unspecified  Pt in multiorgan failure. Suspect combination of septic shock and cardiogenic shock (mottled skin). Pt had Enterobacter Faecalis grow at [facility] (but is sensitive to vanc and has been on vanc). EF from 7/24/19 showed EF 25% (previously 40-45%). This could be stunned myocardium s/p cardiac arrest. He also presented with GIB but his Hgb at OSH has been relatively stable (so not hemorrhagic shock).  - On Levo/Vaso. Will change goal to SBP >90.  - currently on stress dose steroids (hydrocortisone 100mg q8h)  - Spoke with nephrology about Volume management with CRRT (pt with severe pulm HTN, ascites, anasarca, HF)  - Lactate remains > 12. Will trend q6h  - s/p small volume paracentesis yesterday. Ascites fluid consistent with portal htn, not SBP  - continue vanc/cefepime      Shock liver with possible cirrhosis  LFT's continue to increase. Liver US done at [facility] showed cirrhosis and ascites. Unsure why he has cirrhosis (wife states wasn't a drinker, prior hepatitis panels were  neg).   - will attempt to remove fluid with crrt to help with his portal htn.    Anoxic encephalopathy  Unresponsive after rewarming w/ therapeutic hypothermia protocol.  Concerning for significant anoxic injury despite short duration of code. His severe metabolic acidosis could also be playing a factor and has high ammonium.  - Check EEG, this would largely be prognostic.  - If he has persistent seizure like movements, could initiate propofol but his BP is not likely to tolerate this  - Would hold all sedation for now to better neuroprognosticate   - Ammonium is high. ?hepatic encephalopathy playing a role. Being treated with lactulose      Acute on chronic systolic congestive heart failure  Pt in decompensating heart failure. Echo from 7/24/19 showed severe pulmonary HTN (PASP 75), EF 25-30%, decreased RV function. Decreased EF could be from stunned myocardium. Echo from today showed improved EF to 40% (but pt is on double pressers). BNP >4900. Pt with anasarca, pulmonary edema.  - pt needs agressive volume removal however due to hemodynamics, will attempt to pull off slowly and increase as tolerating  - will continue levo/vaso  - please DO NOT give extra fluids if pt becomes hypotensive. Go up with pressers instead.    Acute blood loss anemia  Had a Dieulafoy's lesion that was intervened on at OSH.  Hgb has been stable  - Monitor Hgb and transfuse for < 7  - Keep PLT > 50 if evidence of bleeding, limited data that supports the use of FFP to correct coagulopathy in cirrhotics    Acute on chronic respiratory failure with hypoxia  Intubated after his cardiac arrest - remains intubated. CXR showing pulmonary edema. Pt desaturates when laid flat due to pulmonary edema.  - Continue LPV @ 6cc/kg with goal Pplat < 30  - Hold all sedation for now  - will try and pull fluids off with CRRT    ESRD (end stage renal disease) with Severe Anion Gap Acidosis  Pt with severe metabolic acidosis due to lactic acidosis.   -  Nephrology following  - on crrt. Will try and pull fluid off today  - Electrolyte management per Renal  - on bicarb gtt  - checking abg q6h    Cardiac arrest  Reportedly in the context of hypoxemia?  Aspiration? Anesthesia related?  Code reportedly short in duration (about 4-5 min) and may not have lost a pulse.  S/p therapeutic hypothermia.  Neurologic status poor.  - Neuro checks  - Continue vasopressors as above    Enterococcal bacteremia  Looking at care everywhere, e. Faecalis grew at [facility]. Was pan-sensitive (not VRE)  - continue vanc  - repeat blood cultures so far negative      Pt with poor prognosis. Spent >45 minutes talking to his wife about his prognosis and discussed that we should make him DNR but continue medical management. Advised her to call family members if anyone wants to see him, as now would be appropriate time.     Gilbert Rivers MD  Pulmonology Fellow  [facility]

## 2019-07-29 NOTE — CONSULTS
[facility]  Consult    History of Present Illness: Mr Barker was transferred here from an outside hospital for CRRT, is comatose, on a ventilator, and the history is obtained from the chart.  He apparently was hospitalized with gastrointestinal bleeding, noted to have a Dieulafoy's lesion on EGD.  Has had hemodialysis, has had septic shock due to Enterococcus bacteremia.  On 07/23 he was taken to the operating room for a hemodialysis catheter exchange and suffered a cardiac arrest.  He was noted to be in atrial fibrillation and he was transiently placed on amiodarone.  His wife says he has had coronary stents placed in the past, he has chronic obstructive lung disease, has had renal failure since at least a year, has had atrial fibrillation since April.  He apparently was hospitalized for a clostridium difficile colitis  earlier this year.  Abdominal ultrasound showed ascites and hepatic cirrhosis.      No medications prior to admission.       Review of patient's allergies indicates:  No Known Allergies    Past Medical History:   Diagnosis Date    Atrial fibrillation     CAD in native artery     Chronic hypoxemic respiratory failure     On 2LPM O2 at home    Chronic systolic CHF (congestive heart failure)     COPD (chronic obstructive pulmonary disease)     Diabetes mellitus     ESRD (end stage renal disease) on dialysis     History of Clostridium difficile colitis 05/2019    Hyperlipidemia      Past Surgical History:   Procedure Laterality Date    AV FISTULA PLACEMENT      CORONARY ANGIOPLASTY WITH STENT PLACEMENT      HERNIA REPAIR       History reviewed. No pertinent family history.  Social History     Tobacco Use    Smoking status: Former Smoker     Last attempt to quit: 7/28/2004     Years since quitting: 15.0   Substance Use Topics    Alcohol use: Not Currently    Drug use: Never        Physical Exam:  Unresponsive, on a ventilator.  Chest clear  Heart is irregularly  irregular.  No murmur or gallop.  Abdomen is distended.  There is no pedal edema.    Impression:  Septic shock due to E coli bacteremia                         End-stage renal disease on CRRT                         Atrial fibrillation                         Coronary artery disease                         Left ventricular dysfunction                         Shock liver,?  Hepatic cirrhosis with ascites                         History of COPD    Will review the echocardiogram that is being done by the bedside  Discussed with Pulmonary Critical Care  Thank you for the opportunity of seeing Virgil Barker in consultation

## 2019-07-29 NOTE — CONSULTS
Palliative Care Progress Note:    Bereavement baskets ordered for family. Family informed that they will be delivered to room.

## 2019-07-29 NOTE — CONSULTS
Consult for tube feeding rec's:    If applicable,Novasource Renal @ goal rate 80 mL/hr  If water flush needed, 151 mL flush every 6 hrs  Would provide 2995 kcals, 169 g pro, 2248 mL total fluid        Thanks      Agueda Ellison, Provisional LDN

## 2019-07-29 NOTE — ASSESSMENT & PLAN NOTE
Reportedly in the context of hypoxemia?  Aspiration? Anesthesia related?  Code reportedly short in duration (about 4-5 min) and may not have lost a pulse.  S/p therapeutic hypothermia.  Neurologic status poor.  - Neuro checks  - Continue vasopressors as above   I have reviewed and confirmed nurses' notes...

## 2019-07-29 NOTE — CONSULTS
"Consult to Wound Care for Stage 1 Pressure Injury to sacrum and a Stage 2 Pressure Injury to left crain. Spoke with RN, Saray, in regards to patient. Presently patient's family is at bedside, patient is in a very fragile state. Not appropriate at this time to see patient. Nursing has followed protocol, and placed foam dressings on each area.     Will follow patient tomorrow.    Arabella Pierre RN, Wound and Ostomy    Patient lying in bed, with CRRT running. Patient is none verbal, unsure if cognizant of surroundings. Patient is very edematous. Assisted by RNSaray, and Jodi MCKAY. Pictures taken.      Right buttock and coccyx - Coccyx over the spine has a "slipt' in the skin approximately 0.4 cm X 0. 2 cm, due to moisture. Several other areas are irregular in shape. Due to moisture the patient's skin is more fragile. Friction combined with moisture resulted in shearing. Wound bases are red, slightly weepy. Dressing removed contained mostly dried brown drainage. Gently cleaned with a bath wipe, applied a thick coat of Triad paste (hydrophilic) to areas.     Scrotum - Scrotum is very edematous, with several unroofed blisters. Wound bases are red with lorena-wound area on several unroofed areas darker in color, but still roofed. Largest unroofed blister measured .0.7 cm X 1.7 cm, with the other two areas much smaller in size. Applied Triad Paste to protect areas.    Left lower leg - Crain has an open area with a red base, macerated lorena-wound area. Dried drainage noted on dressing removed. Wound measured approximately 1 cm X 1.2 cm X 0.1, irregular in shape, slightly moist base. Etiology unknown. Cleaned with wound cleanser, patted dry, covered with a foam dressing that can remain in place for 4-5 days.    Left lower leg medial side - A fluid filled blister intact measured approximately 5.5 cm X 1 cm. Covered with a foam dressing to contain any drainage if and when the blister becomes unroofed.    Recommend: Left lower " leg shin and medial aspect - Keep areas covered with a foam dressing and replace every 4-5 days.    Recommend: Scrotum and coccyx areas - After perineal care daily, apply a thick coat of Triad Paster (hydrophilic).     Arabella Pierre RN, Wound and Ostomy    Left shin     Left medial calf blister      Scrotum      Coccyx

## 2019-07-29 NOTE — ASSESSMENT & PLAN NOTE
Pt in decompensating heart failure. Echo from 7/24/19 showed severe pulmonary HTN (PASP 75), EF 25-30%, decreased RV function. Decreased EF could be from stunned myocardium. Echo from today showed improved EF to 40% (but pt is on double pressers). BNP >4900. Pt with anasarca, pulmonary edema.  - pt needs agressive volume removal however due to hemodynamics, will attempt to pull off slowly and increase as tolerating  - will continue levo/vaso  - please DO NOT give extra fluids if pt becomes hypotensive. Go up with pressers instead.

## 2019-07-29 NOTE — PLAN OF CARE
Discussed hypotension despite 2 pressors at max dose and low UOP with eICU. Will initiate Phenylephrine at this time and administer a 500 cc NS bolus.

## 2019-07-29 NOTE — ASSESSMENT & PLAN NOTE
LFT's continue to increase. Liver US done at [facility] showed cirrhosis and ascites. Unsure why he has cirrhosis (wife states wasn't a drinker, prior hepatitis panels were neg).   - will attempt to remove fluid with crrt to help with his portal htn.

## 2019-07-29 NOTE — PROGRESS NOTES
Pt received intubated and on the ventilator with documented settings. ABG was done this AM;results were shown to Korey Mcneill;no changes. Q6 ABG was ordered per Foreign Rivers. ABG was obtained @ 1240;reported results;no changes were made at this time. Will continue to monitor.

## 2019-07-29 NOTE — ASSESSMENT & PLAN NOTE
Unresponsive after rewarming w/ therapeutic hypothermia protocol.  Concerning for significant anoxic injury despite short duration of code. His severe metabolic acidosis could also be playing a factor and has high ammonium.  - Check EEG, this would largely be prognostic.  - If he has persistent seizure like movements, could initiate propofol but his BP is not likely to tolerate this  - Would hold all sedation for now to better neuroprognosticate   - Ammonium is high. ?hepatic encephalopathy playing a role. Being treated with lactulose

## 2019-07-29 NOTE — CONSULTS
Consult Note  Palliative Care      Consult Requested By: Jasbir Garsia MD  Reason for Consult:GOC and AD    SUBJECTIVE:   Referral Facility:  [facility]   Reason for transfer request:  CRRT. Pt on ventilator with acute CHF and Acute renal Failure  Date/Time of Acceptance: 7/28/2019 10:26 AM  Accepting facility: [facility] ([facility])     [facility] ED:  VS  97.9, 118, 20, 96/45, 116/51, 94% on ventilator  Labs: A1c 4.9, chol 96, trop .24 , Hb 9.1    HPI: Mr. Barker is a 63 year old man with history of ESRD, chronic systolic chf, chronic hypoxic respiratory failure, coronary artery disease, atrial fibrillation, hyperlipidemia and history of C.diff colitis in May of this year who was transferred from [facility] for evaluation by nephrology for CRRT.  He is intubated and sedated and unable to provide history.      Mr. Barker was admitted to [facility] 7/17 with profound anemia, weakness and hypotension.  He was transfused 2 units PRBC and taken for EGD on 7/19 by Dr. Mary Motley.  The EGD showed a dieulafoy lesion and small gastric ulcer which was clipped and biopsied.  The biopsy did not reveal any cancer and was negative for H.pylori.  Additionally, the patient was found to have sepsis due to Enterococcal bacteremia presumed due to a dialysis line infection.  The line was removed and he was given a line holiday.  He was treated with vancomycin and zosyn.  Subsequently he was taken to the OR for placement of a tunneled dialysis catheter.  In the OR he became hypoxic with respiratory distress while in the [place] position.  His chart has conflicting notation regarding cardiac arrest.  Apparently he did receive ACLS therapies for 4-5 minutes and was intubated.  He did under go therapeutic hypothermia protocol but remained with a profound encephalopathy.  Notation by his cardiologist there indicates there was no definitive evidence of cardiac arrest.  His surgeon believed his  "pulses were thready.  Post "arrest" EKG did not reveal any acute injury pattern.  Post arrest echo however did reveal a decrease in EF from 40-45% (6/10/18) to 25-30% (7/23/19).  He required treatment with pressors and was transferred to the ICU there.  In the ICU he received dialysis and his pulmonary edema improved.  He did develop atrial fibrillation with rvr and was started on an amiodarone drip, which was subsequently discontinued when he developed significant transaminitis.  In the 24 hours prior to transfer here, he required more pressor support with vasopressin, norepinephrine, neosynephrine and a bicarb drip.  This morning he was "chewing" on his ETT and disloged it with subsequent hypoxia and worsening hypotension.  A rapid response was called, the tube was repositioned and a fentanyl drip was started and subsequently changed to precedex.  Per his attending notes, he was seen by neurology who are not concerned for brain death.  This morning, pH was noted to be 6.98 and cardiology and nephrology there recommended transfer to a facility where he could receive continuous renal replacement therapy.  At the time of arrival to [facility] he is calmly sedated on a precedex drip with ongoing norepinephrine and vasopressin drips.    Past Medical History:   Diagnosis Date    Atrial fibrillation     CAD in native artery     Chronic hypoxemic respiratory failure     On 2LPM O2 at home    Chronic systolic CHF (congestive heart failure)     COPD (chronic obstructive pulmonary disease)     Diabetes mellitus     ESRD (end stage renal disease) on dialysis     History of Clostridium difficile colitis 05/2019    Hyperlipidemia      Past Surgical History:   Procedure Laterality Date    AV FISTULA PLACEMENT      CORONARY ANGIOPLASTY WITH STENT PLACEMENT      HERNIA REPAIR       History reviewed. No pertinent family history.  Social History     Tobacco Use    Smoking status: Former Smoker     Last attempt to " "quit: 7/28/2004     Years since quitting: 15.0   Substance Use Topics    Alcohol use: Not Currently    Drug use: Never       Mental Status: Non-responsive, intubated    ECOG Performance Status Grade: 4 - Completely disabled    Review of Systems:  Review of systems not obtained due to patient factors Pt intubated.    OBJECTIVE:     Pain Assessment: No pain reported at this time. No grimacing or moaning noted.     Decision-Making Capacity: Patient unable to communicate due to disease severity/cognitive impairment. Pt is  intubated in ICU.    Advanced Directives:  Living Will: No  Do Not Resuscitate Status: Yes  Medical Power of : No  Registered Organ Donor: unknown    Living Arrangements: Lives with spouse, Lives in home    Psychosocial, Spiritual, Cultural:  Patient's most important priorities:  Unable to assess    Patient's biggest concerns/fears:  Unable to assess    Previous death/end of life care history:  Unable to assess    Patient's goals/hopes:  Unable to assess    ASSESSMENT/PLAN:   Palliaitve care introduced. Brochure and business card given. Wife at BS. Son came during consultation. Family conference held in consultation room. Wife able to repeat prognosis in talk-back process. Discussed code status and what Pt's EOL wishes would be. Son states that father "is basically gone and there's no coming back". Wife in tearful agreement. Pt code status is designated as DNR. Discussed comfort measures. Family wants to remain selective measures at this time to wait for other members to be able to say good bye.    Family allowed to express feelings of love and support for PT. Emotional support provided.    Family states they are Alevism by alex.  Nikolay services offered to family. Family would like Nikolay to provide blessing and prayer. Family requests visit from . Will relay message.  Will order bereavement baskets for family.   Family encouraged to use consultation room for privacy if needed. " "    Education provided:  Book: "Hard Choices For [place] People"  Book: NIH book, " EOL Care"  Book: "Care For The Caregiver: guidebook    Family encouraged to call PC for needs, questions or concerns.    Thank you for allowing Palliaitve Care to participate in Mr. Barker's care.    Shani Mccord  Palliative Care   [facility]    Recommendations:  1. Emotional Support as needed    2. Education as needed        Time Spent: 20 minute chart review,  75 minute BS consultation, 20 minute team update, 5 minutes faxing ACP documents, 15 minutes charting.    "

## 2019-07-29 NOTE — ASSESSMENT & PLAN NOTE
-Patient remains with profound multifactorial encephalopathy  -Likely component of anoxic encephalopathy as well as metabolic encephalopathy due to:  Sepsis, bacteremia, uremia, hepatic encephalopathy and possible seizure  -He did complete therapeutic cooling after cardiac arrest at [facility].  -Presently off sedation and has roving eye movements, repetitive back and forth movement of head, withdrawal to noxious stimuli in all extremities and no response to voice.  -Continue to treat metabolic abnormalities:  Sepsis, acidosis, uremia and hyperammonemia  -Continue lactulose.  -Await EEG  -Will need to consult neurology in the next day or two if no improvement.

## 2019-07-29 NOTE — ASSESSMENT & PLAN NOTE
- looks like his plt count before he developed shock liver on 7/28 was slightly low around 100-110s range. However after he developed shock liver on 7/28, his plt count acutely dropped from 110 yesterday morning to 27 today  - peripheral smear reviewed. Marked echinocytes (disformed RBCs) which can be seen in severe liver disease  - fibrinogen low, may have some low grade DIC.   - likely multifactorial.  The timing of the plt count drop is most suggestive of liver injury as the main cause.   - need CT abdomen/pelvis to r/o intraabdominal hematoma  - check fibrinogen daily. Transfuse cryo for fibrinogen<100  - transfuse for plt count <20k in the absence of bleeding, <50k in the presence of bleeding

## 2019-07-30 PROBLEM — R53.81 DEBILITY: Status: RESOLVED | Noted: 2019-01-01 | Resolved: 2019-01-01

## 2019-07-30 PROBLEM — I25.10 CORONARY ARTERY DISEASE INVOLVING NATIVE CORONARY ARTERY: Status: RESOLVED | Noted: 2019-01-01 | Resolved: 2019-01-01

## 2019-07-30 PROBLEM — D62 ACUTE BLOOD LOSS ANEMIA: Status: RESOLVED | Noted: 2019-01-01 | Resolved: 2019-01-01

## 2019-07-30 PROBLEM — Z71.89 GOALS OF CARE, COUNSELING/DISCUSSION: Status: RESOLVED | Noted: 2019-01-01 | Resolved: 2019-01-01

## 2019-07-30 PROBLEM — R23.9 ALTERATION IN SKIN INTEGRITY DUE TO MOISTURE: Status: ACTIVE | Noted: 2019-01-01

## 2019-07-30 PROBLEM — D69.6 THROMBOCYTOPENIA: Status: RESOLVED | Noted: 2019-01-01 | Resolved: 2019-01-01

## 2019-07-30 PROBLEM — I48.91 ATRIAL FIBRILLATION: Status: RESOLVED | Noted: 2019-01-01 | Resolved: 2019-01-01

## 2019-07-30 NOTE — ASSESSMENT & PLAN NOTE
Likely shock liver due to shock but also possibly be hepatic injury related to recent amiodarone use.  Continue with lactulose.

## 2019-07-30 NOTE — ASSESSMENT & PLAN NOTE
Liver US done at [facility] showed cirrhosis and ascites. Unsure why he has cirrhosis (wife states wasn't a drinker, prior hepatitis panels were neg).   - will remove fluid with crrt to help with his portal htn.  - small volume paracentesis didn't show any SBP

## 2019-07-30 NOTE — ASSESSMENT & PLAN NOTE
Pt in decompensating heart failure. Echo from 7/24/19 showed severe pulmonary HTN (PASP 75), EF 25-30%, decreased RV function. Decreased EF could be from stunned myocardium. BNP >4900. Pt with anasarca, pulmonary edema.  - pt needs agressive volume removal however due to hemodynamics, will attempt to pull off slowly and increase as tolerating  - will continue levo/vaso  - please DO NOT give extra fluids if pt becomes hypotensive. Go up with pressers instead.  - echo report back that is essentially normal. I really don't understand how that is possible. I question whether the echo report here is on a different patient given that echo from last week showed severe pulm HTN, moderate TR and our echo doesn't.

## 2019-07-30 NOTE — PROGRESS NOTES
"Nephrology  Progress Note    Admit Date: 7/28/2019   LOS: 2 days     SUBJECTIVE:     Follow-up For:  Shock, unspecified/ESRD    Interval History:     Remains about the same.  Vented, on 2 vasopressors, and unresponsive.  EEG noted.  Labs reviewed and discussed with team at bedside.  CRRT continues without any major issues.  Net negative about 1600 cc.    Review of Systems:   unable.     OBJECTIVE:     Vital Signs Range (Last 24H):  BP (!) 110/52   Pulse (!) 112   Temp 96.8 °F (36 °C)   Resp 17   Ht 6' 1" (1.854 m)   Wt 115 kg (253 lb 8.5 oz)   SpO2 (!) 88%   BMI 33.45 kg/m²     Temp:  [93.9 °F (34.4 °C)-96.8 °F (36 °C)]   Pulse:  []   Resp:  [11-33]   BP: ()/(35-74)   SpO2:  [75 %-100 %]   Arterial Line BP: ()/(34-48)     I & O (Last 24H):    Intake/Output Summary (Last 24 hours) at 7/30/2019 0804  Last data filed at 7/30/2019 0700  Gross per 24 hour   Intake 6054.55 ml   Output 7686 ml   Net -1631.45 ml       Physical Exam:  General appearance: critically ill and unresponsive.    Eyes:  Sluggish.  Lungs: vented/diminished.   Heart:  AFib tachy-Irregular rate and rhythm, S1, S2 normal, no murmur, rub or kevin.  Abdomen: semi-firm, distended-ascites,  bowel sounds minimal; OGT.  Extremities: cool extremities.  Dep edema.    Skin: cool to touch.   Neurologic: unable to fully assess.   Right IJ SHARRI. LUE AVF with palpable thrill      Laboratory Data:  Recent Labs   Lab 07/30/19  0335   WBC 9.40   RBC 2.45*   HGB 8.1*   HCT 26.9*   PLT 46*   *   MCH 33.1*   MCHC 30.1*       BMP:   Recent Labs   Lab 07/30/19  0335   *  117*   *  133*   K 4.0  4.0   CL 95  95   CO2 14*  14*   BUN 20  20   CREATININE 2.3*  2.3*   CALCIUM 7.9*  7.9*   MG 1.9     Lab Results   Component Value Date    CALCIUM 7.9 (L) 07/30/2019    CALCIUM 7.9 (L) 07/30/2019    PHOS 4.1 07/30/2019       Lab Results   Component Value Date    CALCIUM 7.9 (L) 07/30/2019    CALCIUM 7.9 (L) 07/30/2019    MARIAN" 0.89 (L) 07/30/2019    PHOS 4.1 07/30/2019     Recent Labs   Lab 07/30/19  0335   ALT 1,534*   AST 2,902*   ALKPHOS 161*   BILITOT 7.3*   PROT 7.6   ALBUMIN 3.0*  3.0*       No results found for: URICACID    BNP  Recent Labs   Lab 07/28/19  1601   BNP >4,900*       Medications:  Medication list was reviewed and changes noted under Assessment/Plan.    Diagnostic Results:     This is an abnormal EEG during obtunded state.    Diffuse disorganized slowing of the background is suggestive of a   moderate-to-severe encephalopathy, nonspecific to the cause.  There is no   evidence of an epileptic process on this recording.  No seizures were recorded   during this study.      ASSESSMENT/PLAN:        ESRD on HD with hypertensive heart and acute on chronic systolic HF  - Two Physician emergency consent obtained.    - initiated CRRT for volume removal and metabolic clearance, but unable to pull with degree of shock initially.  -tolerating gentle UF but still on aggressive vasopressors    - Surgeon Dr. Deysi Cantu placed LUE AVF last month.  -continue with CRRT for now.   -replace electrolytes as needed  -Renally dose meds, avoid nephrotoxins, and monitor I/O's closely.    Worsening AGMA/Lactic Acidosis with PH 7.1:  -restarted bicarb drip.  -CRRT as now.      Septic shock due to Enterococcus bacteremia  - BCx from [facility] reviewed and susceptible to Vanc, Ampicillin, Gent  - Pressors to maintain MAP>60.  -id consult noted     Pulmonary HTN, afib with RVR  - Followed as outpt by Dr. Jose Bernstein.     Cardiac arrest  - Will need Neuro input on brain activity as he has completed hypothermia protocol at [facility].  -EEG reviewed with severe encephalopathy.     Ischemic hepatitis/>NH3  - Due to shock.  -defer    Multifactorial anemia and thrombocytopenia:  -trends noted.   -consumptive vs early DIC.  -Hem eval noted   -transfusions deferred       Dispo:  dismal prognosis.  Agree with DNR.  Await family decision about continued  support     See above

## 2019-07-30 NOTE — PLAN OF CARE
Problem: Adult Inpatient Plan of Care  Goal: Plan of Care Review  Outcome: Ongoing (interventions implemented as appropriate)  Hypothermic on Jolie hugger throughout shift around 96 degrees c esophageal probe. Ventilated on a/c c ABGs drawn X2. A. Fib c rate 100-120 rate. SBP >90 on Levo and Vaso. D 5% c NaBicarb continued at 150 cc/hr. 1 unit platelets and 1 unit PRBC administered.  BG WDL; no coverage needed. Opens eyes spontaneously and withdraws from pain. No definitve purposeful movements. CRRT maintained c removal goal  cc/hr. Extremities dusky; kept warm c warm blankets throughout shift. Absent bowel sounds. One medium BM brown/dark red in color. eICU notified and occult stool sent to lab. Mg replaced. Nephro called to add ionized Ca lab. Tube feed titrated and residual checked q2. Wife at bedside. Bathed. Bed low and locked.

## 2019-07-30 NOTE — ASSESSMENT & PLAN NOTE
Pt with severe metabolic acidosis due to lactic acidosis.   - Nephrology following  - on crrt. Will continue to pull fluid off today  - Electrolyte management per Renal  - on bicarb gtt  - ok to space out checking abg to q8h

## 2019-07-30 NOTE — ASSESSMENT & PLAN NOTE
Reportedly in the context of hypoxemia?  Aspiration? Anesthesia related?  Code reportedly short in duration (about 4-5 min) and may not have lost a pulse.  S/p therapeutic hypothermia.  Neurologic status poor.  - Neuro checks  - Continue vasopressors as above

## 2019-07-30 NOTE — ASSESSMENT & PLAN NOTE
Echocardiogram on 6/10/18 showed EF 40-45%.  Echocardiogram on 7/23/19 showed EF 25-30% with severe pulmonary hypertension.  Echocardiogram on 7/29 per Dr. Brown shows EF of 40-45%.  He is fluid overloaded with pulmonary edema and ascites and with peripheral edema.  Continue with dialysis for volume removal.

## 2019-07-30 NOTE — ASSESSMENT & PLAN NOTE
Pt in multiorgan failure. Suspect combination of septic shock and cardiogenic shock (mottled skin). Pt had Enterobacter Faecalis grow at [facility] (but is sensitive to vanc and has been on vanc). EF from 7/24/19 showed EF 25% (previously 40-45%). This could be stunned myocardium s/p cardiac arrest. He also presented with GIB but his Hgb at OSH has been relatively stable (so not hemorrhagic shock).  - On Levo/Vaso. Goal of SBP >90.  - currently on stress dose steroids (hydrocortisone 100mg q8h)  - continue volume removal with CRRT  - Lactate remains > 12. Hasn't changed at all. Can space out how often we do blood draws on this.  - continue vanc/cefepime

## 2019-07-30 NOTE — PROGRESS NOTES
[facility] Hospital Medicine  Progress Note    Patient Name: Virgil Barker  MRN: [id number]  Patient Class: IP- Inpatient   Admission Date: 7/28/2019  Length of Stay: 2 days  Attending Physician: Brian Romero MD  Primary Care Provider: Primary Doctor No        Subjective:     Principal Problem:Shock      HPI:  Mr. Barker is a 63 year-old man with history of chronic systolic heart failure, chronic obstructive pulmonary disease with chronic hypoxic respiratory failure on home oxygen, coronary artery disease, and end-stage renal disease started on hemodialysis via a tunneled dialysis line about 6 months ago who was admitted to [facility] on 7/17/2019 with generalized weakness and worsening in baseline hemoglobin.  Patient was transfused 3 units of pack red blood cells and he underwent upper endoscopy on 7/19/2019 which revealed a dieulafoy lesion and a small gastric ulcer which was clipped and biopsied.  Biopsy negative for malignancy and negative for H. pylori.    In addition patient was found to be septic secondary to Enterococcus bacteremia due to presumed dialysis line infection.  Patient was treated intravenous antibiotics.  Presumed infected line was removed.  Patient went to the operating room for placement of a new dialysis catheter.  Patient suffered cardiac arrest and was resuscitated and required vasopressors.  Patient was transferred to the intensive care unit.  He developed atrial fibrillation rapid response which was treated with amiodarone which was subsequently discontinued due to evidence of liver injury.  While intensive care unit unit he also received dialysis to treat pulmonary edema.  Patient with a rate came progressively more hypotensive in would not tolerate additional hemodialysis and therefore was transferred to [facility] stents of care unit for initiation of continuous renal replacement therapy.    Overview/Hospital Course:  Patient  transferred to [facility] intensive care unit on 7/28/2019.  Patient presents with evidence of severe shock with multiorgan failure.  Nephrology service consulted and patient started on continuous renal replacement therapy but has had persistent metabolic acidosis with persistent lactic acidosis.  Pulmonary Critical Care also consulted for management of his ventilator and vasopressors.  Patient also on antibiotic treatment directed at Enterococcus as per recommendations by our Infectious Disease specialist Dr. Kimmy Garcia.  Despite these measures and despite withholding holding all sedation he has very poor neurological status with involuntary movements of his head and neck but otherwise no purposeful movement and even limited response to painful stimuli.  Patient also continues to require high doses of multiple vasopressors to maintain reasonable mean arterial pressure.  Patient also with significant thrombocytopenia most likely due to disseminated intravascular coagulation due to shock.  Patient also started on lactulose to treat component of hepatic encephalopathy due to liver failure.  Following meetings with the Critical Care Service and with Palliative Care nurse the decision was to continue with aggressive medical care for now but otherwise to not perform chest compressions in the event of cardiac arrest.  Despite aggressive care patient appears to continue to decline.    Interval History:  No significant clinical improvement with no purposeful movements despite holding all sedation.  Minimally responsive to painful stimuli.  Continues require higher doses of vasopressors with persistent acidosis despite continuous renal replacement therapy.    Review of Systems   Unable to perform ROS: Intubated     Objective:     Vital Signs (Most Recent):  Temp: 96.8 °F (36 °C) (07/30/19 1200)  Pulse: (!) 128 (07/30/19 1200)  Resp: 15 (07/30/19 1200)  BP: 105/62 (07/30/19 1200)  SpO2: (!) 92 % (07/30/19 1200) Vital  Signs (24h Range):  Temp:  [93.9 °F (34.4 °C)-97.2 °F (36.2 °C)] 96.8 °F (36 °C)  Pulse:  [] 128  Resp:  [13-32] 15  SpO2:  [75 %-100 %] 92 %  BP: ()/(35-74) 105/62  Arterial Line BP: ()/(34-48) 142/44     Weight: 115 kg (253 lb 8.5 oz)  Body mass index is 33.45 kg/m².    Intake/Output Summary (Last 24 hours) at 7/30/2019 1228  Last data filed at 7/30/2019 1115  Gross per 24 hour   Intake 6591.42 ml   Output 8763 ml   Net -2171.58 ml      Physical Exam   HENT:   Head: Normocephalic and atraumatic.   Endotracheal tube and orogastric tube in place.  Poor dentition.   Eyes: Pupils are equal, round, and reactive to light.   Neck: Normal range of motion. Neck supple.   Right chest wall central line in place.   Cardiovascular:   Tachycardic, no murmurs appreciated   Pulmonary/Chest: Effort normal and breath sounds normal. No respiratory distress.   Abdominal: Soft. He exhibits no distension. There is no tenderness.   Non-distended, non-tender, no apparent bowel sounds.   Musculoskeletal: He exhibits edema.   Neurological:   Abnormal involuntary head and neck movements from side to side.  Eyes open reactive to light but no tracking.  Minimal response to painful stimuli.   Skin: Skin is warm and dry.   Stage I sacral breakdown, stage II ulcer to left shin.  Cyanotic digits in his hands and feet bilaterally.   Vitals reviewed.      Significant Labs: All pertinent labs within the past 24 hours have been reviewed.    Significant Imaging: I have reviewed all pertinent imaging results/findings within the past 24 hours.      Assessment/Plan:      * Shock  Unclear if this is septic shock due to Enterococcal bacteremia or cardiogenic after cardiac arrest or combination of both.  Presumed infected central line removed and once he is Hospital followed by line holiday with placement of new dialysis line.  Repeat cultures have been negative.  Continue intravenous antibiotics as per Infectious Disease (currently on  intravenous ampicillin and ceftriaxone).  Also treating with intravenous hydrocortisone for possible adrenal insufficiency.  However despite aggressive measures with continuous dialysis, intravenous antibiotics, and vasopressor support, patient has not made significant clinical improvement to this point.  Suspect unlikely patient with make a meaningful recovery but will continue with current aggressive measures for now.  Discussed with Pulmonary Critical Care.    Enterococcal bacteremia  Repeat blood cultures negative today.  Continue with current intravenous antibiotics.    End stage renal disease  Patient with persistent severe lactic acidosis.  Continue with continuous renal replacement therapy along with continuous bicarbonate infusion as per Nephrology.    Acute on chronic systolic congestive heart failure  Echocardiogram on 6/10/18 showed EF 40-45%.  Echocardiogram on 7/23/19 showed EF 25-30% with severe pulmonary hypertension.  Echocardiogram on 7/29 per Dr. Brown shows EF of 40-45%.  He is fluid overloaded with pulmonary edema and ascites and with peripheral edema.  Continue with dialysis for volume removal.    Acute on chronic respiratory failure with hypoxia  Patient with history of chronic obstructive pulmonary disease with chronic respiratory failure on home oxygen and also history of systolic heart failure.  Continue with mechanical ventilation.  Continue with volume removal as tolerated with dialysis to treat pulmonary edema.    Disseminated intravascular coagulation  Likely due to severe sepsis and shock.  Continue replace blood products as recommended by hematology.  No evidence of her bleeding at this time.    Type 2 diabetes mellitus  Continue with tube feedings and sliding scale insulin as needed.    Malnutrition of mild degree  Continue with tube feedings.    Shock liver with possible cirrhosis  Likely shock liver due to shock but also possibly be hepatic injury related to recent amiodarone  use.  Continue with lactulose.    Multifactorial encephalopathy  Electroencephalography show no evidence of seizure activity.  Severe encephalopathy likely due to combination of anoxic brain injury and metabolic encephalopathy due to infection, renal failure, and liver failure.  Continue with current treatment however unlikely to make a meaningful recovery.    Cardiac arrest  Continue medical therapy as per Cardiology and Pulmonary Critical Care.    VTE Risk Mitigation (From admission, onward)        Ordered     Place sequential compression device  Until discontinued      07/28/19 1458     IP VTE HIGH RISK PATIENT  Once      07/28/19 1458                Brian Romero MD  Department of Hospital Medicine   [facility]

## 2019-07-30 NOTE — ASSESSMENT & PLAN NOTE
-Was on anticoagulation prior to hospitalization at [facility].    -Was on heparin drip at [facility] until 7/25.  -Presently in afib with moderately elevated rate   -No AC at this time due to recent GI bleeding and severe thrombocytopenia  -Was treated with amiodarone at [facility] but developed severe transaminitis so it was discontinued  -Dr. Brown on board.

## 2019-07-30 NOTE — ASSESSMENT & PLAN NOTE
Intubated after his cardiac arrest - remains intubated. CXR showing pulmonary edema. Pt desaturates when laid flat due to pulmonary edema.  - Continue LPV @ 6cc/kg with goal Pplat < 30  - Hold all sedation for now  - will pull fluids off with CRRT

## 2019-07-30 NOTE — ASSESSMENT & PLAN NOTE
Patient with persistent severe lactic acidosis.  Continue with continuous renal replacement therapy along with continuous bicarbonate infusion as per Nephrology.

## 2019-07-30 NOTE — ASSESSMENT & PLAN NOTE
-Requred 4 units PRBC at [facility] early in his stay from presumed GI bleeding.  -Had EGD there on 7/19 by Dr. Cohen showing dileufoy leasion and gastric ulcer which was clipped  -Continue BID PPI for now.  -No active bleeding presently  -Repeat cbc daily.

## 2019-07-30 NOTE — CONSULTS
Palliative Care Progress Note:    Spoke to Mrs. Barker. She verbalized feelings of hope for her 's recovery. Wife states that she doesn't want to think that he may not get better. She expressed she told the [place] that they only wanted to pray for recovery, not EOL. Wife allowed to verbalize feelings of sadness, guilt and love. Emotional support given. Encouraged to call for questions, concerns or support.

## 2019-07-30 NOTE — CONSULTS
Consult to Wound Care for skin tears to scrotum. Telephone call to ICU and spoke with RN, Saray, who reported they have covered area with Triad Paste (hydrophili) and elevated the scrotum for comfort. Will be able to see patient later today.    Arabella Pierre RN, Wound and Ostomy

## 2019-07-30 NOTE — ASSESSMENT & PLAN NOTE
Electroencephalography show no evidence of seizure activity.  Severe encephalopathy likely due to combination of anoxic brain injury and metabolic encephalopathy due to infection, renal failure, and liver failure.  Continue with current treatment however unlikely to make a meaningful recovery.

## 2019-07-30 NOTE — PLAN OF CARE
Problem: Adult Inpatient Plan of Care  Goal: Plan of Care Review  Outcome: Ongoing (interventions implemented as appropriate)  Patient remains on vent.  Will continue to monitor.

## 2019-07-30 NOTE — PROGRESS NOTES
Chart reviewed.     - Overnight Hb dropped from 7.4 to 6.2, plt count from 24 to 18. Got 1 unit of PRBC and 1 unit plt transfusion. Today Hb improved to 8.1 -> 7.9, plt count 46 ->28. Fibrinogen 103  - Guaiac stool is POSITIVE.     Medication reviewed.   Labs reviewed.   Vital signs reviewed.     ASSESSMENT AND PLANT:  1. Thrombocytopenia  2. Anemia  3. DIC    - Patient is bleeding from the GI tract  - transfuse to keep plt above 50k, Hb above 7, fibrinogen above 100  - on protonix 40 mg bid already  - [facility] consult GI for GI bleed     Will follow.

## 2019-07-30 NOTE — ASSESSMENT & PLAN NOTE
Patient with history of chronic obstructive pulmonary disease with chronic respiratory failure on home oxygen and also history of systolic heart failure.  Continue with mechanical ventilation.  Continue with volume removal as tolerated with dialysis to treat pulmonary edema.

## 2019-07-30 NOTE — PLAN OF CARE
Problem: Adult Inpatient Plan of Care  Goal: Plan of Care Review  Outcome: Ongoing (interventions implemented as appropriate)  Received patient intubated on ordered ventilator settings. No changes made throughout the night. Ambu bag and mask at bedside. Q6 abg done, results given to EICU MD. Will continue to monitor.

## 2019-07-30 NOTE — PROGRESS NOTES
Cardiology  Progress Notes            Subjective: Somnolent, on a ventilator, on vasopressor support, on CRRT.  Vital Signs:  Vitals:    07/30/19 0852 07/30/19 0900 07/30/19 0915 07/30/19 0930   BP:  (!) 93/53  (!) 87/52   BP Location:       Patient Position:       Pulse: 110 110 (!) 118 (!) 114   Resp: 16 16 17 16   Temp: 96.8 °F (36 °C) 96.8 °F (36 °C) 96.8 °F (36 °C) 96.4 °F (35.8 °C)   TempSrc:       SpO2: 100% (!) 94%  95%   Weight:       Height:           Physical Exam: Chest clear  Cor: Irregularly irregular. No murmur or gallop.  Laboratory:  CBC:   Recent Labs   Lab 07/30/19 0335   WBC 9.40   RBC 2.45*   HGB 8.1*   HCT 26.9*   PLT 46*   *   MCH 33.1*   MCHC 30.1*     BMP:   Recent Labs   Lab 07/30/19  0335   *  117*   *  133*   K 4.0  4.0   CL 95  95   CO2 14*  14*   BUN 20  20   CREATININE 2.3*  2.3*   CALCIUM 7.9*  7.9*   MG 1.9     CMP:   Recent Labs   Lab 07/30/19  0335   *  117*   CALCIUM 7.9*  7.9*   ALBUMIN 3.0*  3.0*   PROT 7.6   *  133*   K 4.0  4.0   CO2 14*  14*   CL 95  95   BUN 20  20   CREATININE 2.3*  2.3*   ALKPHOS 161*   ALT 1,534*   AST 2,902*   BILITOT 7.3*     LFTs:   Recent Labs   Lab 07/30/19  0335   ALT 1,534*   AST 2,902*   ALKPHOS 161*   BILITOT 7.3*   PROT 7.6   ALBUMIN 3.0*  3.0*     Coagulation:   Recent Labs   Lab 07/30/19  0335   INR 3.5*     Cardiac markers: No results for input(s): CKMB, CPKMB, TROPONINT, TROPONINI, MYOGLOBIN in the last 168 hours.    Imaging:  US Lower Extremity Veins Bilateral   Final Result      No evidence of deep venous thrombosis in either lower extremity.  Nonvisualization of the right greater saphenous vein.         Electronically signed by: Angela Dominguez   Date:    07/28/2019   Time:    19:35      X-Ray Chest AP Portable   Final Result      As above.         Electronically signed by: Leobardo Chavarria MD   Date:    07/28/2019   Time:    16:19            Problems:   1. Septic shock, enterococcus  bacteremia  2. ESRD on HD  3. Chronic atrial fibrillation  4. Hypertensive heart disease  5. COPD   6. Shock liver, hepatic cirrhosis  7. Anemia of chronic diseases          Prognosis poor.        Plan of Care: See Orders          Marquise Zafar

## 2019-07-30 NOTE — SUBJECTIVE & OBJECTIVE
Interval History: Pt severely sick. Overnight he was given 1 U PRBC and 1U of platelets. Has been off all sedatives and still not waking up to follow commands. He does move spontaneously, has reflexes and pupils are reactive so he's not brain dead. In intubated and remains on double pressers. Unchanged from yesterday.    Objective:     Vital Signs (Most Recent):  Temp: 97.2 °F (36.2 °C) (07/30/19 1334)  Pulse: (!) 114 (07/30/19 1334)  Resp: 19 (07/30/19 1334)  BP: 105/62 (07/30/19 1200)  SpO2: (!) 86 % (07/30/19 1334) Vital Signs (24h Range):  Temp:  [94.1 °F (34.5 °C)-97.5 °F (36.4 °C)] 97.2 °F (36.2 °C)  Pulse:  [] 114  Resp:  [13-32] 19  SpO2:  [75 %-100 %] 86 %  BP: ()/(35-74) 105/62  Arterial Line BP: ()/(34-48) 124/38     Weight: 115 kg (253 lb 8.5 oz)  Body mass index is 33.45 kg/m².      Intake/Output Summary (Last 24 hours) at 7/30/2019 1347  Last data filed at 7/30/2019 1323  Gross per 24 hour   Intake 6979.2 ml   Output 8805 ml   Net -1825.8 ml       Physical Exam   Constitutional: He appears well-nourished.   Sick appearing male   HENT:   Head: Normocephalic and atraumatic.   Right Ear: External ear normal.   Left Ear: External ear normal.   Eyes: Pupils are equal, round, and reactive to light. Conjunctivae are normal. Right eye exhibits no discharge. Left eye exhibits no discharge.   Neck: Neck supple. No thyromegaly present.   Cardiovascular: Normal rate and regular rhythm. Exam reveals no friction rub.   No murmur heard.  Pulmonary/Chest: Effort normal. No stridor. No respiratory distress. He has no wheezes. He has no rales.   Abdominal: Soft. He exhibits distension.   Significant amount of ascites   Musculoskeletal: He exhibits edema.   Neurological:   Doesn't follow commands. Moves extremities spontaneously. Withdraws to pain. Pupils reactive to light.   Skin: Capillary refill takes less than 2 seconds. He is not diaphoretic.   Mottled skin of distal extremities.       Vents:  Vent  Mode: VC-AC (07/30/19 1334)  Ventilator Initiated: Yes (07/28/19 1451)  Set Rate: 26 bmp (07/30/19 1334)  Vt Set: 450 mL (07/30/19 1132)  PEEP/CPAP: 5 cmH20 (07/30/19 1334)  Oxygen Concentration (%): 35 (07/30/19 1334)  Peak Airway Pressure: 8.9 cmH2O (07/30/19 1334)  Total Ve: 14.2 mL (07/30/19 1334)  F/VT Ratio<105 (RSBI): (!) 24.8 (07/30/19 1334)    Lines/Drains/Airways     Central Venous Catheter Line                 Hemodialysis Catheter 07/23/19 right subclavian 7 days         Percutaneous Central Line Insertion/Assessment - triple lumen  07/23/19 right femoral vein;right femoral 7 days          Drain                 NG/OG Tube orogastric Center mouth -- days         Hemodialysis AV Fistula 06/20/19 Left upper arm 40 days          Airway                 Airway - Non-Surgical 07/28/19 Endotracheal Tube 2 days          Arterial Line                 Arterial Line 07/23/19 Right Radial 7 days                Significant Labs:    CBC/Anemia Profile:  Recent Labs   Lab 07/29/19  1000 07/29/19  1151 07/29/19  2024 07/30/19  0257 07/30/19  0335   WBC  --  9.07 8.83  --  9.40   HGB  --  7.4* 6.2*  --  8.1*   HCT  --  24.6* 21.5*  --  26.9*   PLT  --  24* 18*  --  46*   MCV  --  109* 114*  --  110*   RDW  --  21.6* 22.5*  --  21.8*   FOLATE 7.6  --   --   --   --    QITEAZLF77 >2000*  --   --   --   --    OCCULTBLOOD  --   --   --  Positive*  --         Chemistries:  Recent Labs   Lab 07/28/19  1601  07/29/19  0410  07/29/19  1420 07/29/19  2139 07/30/19  0335 07/30/19  1210      < > 135*  135*  --  135* 133* 133*  133* 133*   K 4.8   < > 4.3  4.3  --  4.1 4.1 4.0  4.0 3.9   CL 98   < > 100  100  --  97 96 95  95 93*   CO2 11*   < > 11*  11*  --  17* 15* 14*  14* 12*   BUN 29*   < > 28*  28*  --  24* 21 20  20 18   CREATININE 3.9*   < > 3.4*  3.4*  --  2.8* 2.5* 2.3*  2.3* 2.2*   CALCIUM 8.2*   < > 7.9*  7.9*  --  7.8* 7.8* 7.9*  7.9* 8.1*   ALBUMIN 2.9*   < > 3.0*  3.0*  --  2.9* 2.8* 3.0*  3.0*  2.8*   PROT 7.6  --  7.7  --   --   --  7.6  --    BILITOT 3.9*  --  5.4*  --   --   --  7.3*  --    ALKPHOS 134  --  146*  --   --   --  161*  --    ALT 1,790*  --  1,811*  --   --   --  1,534*  --    AST 4,507*  --  4,122*  --   --   --  2,902*  --    MG 1.9   < > 1.7   < > 2.0  --  1.9 2.3   PHOS 7.5*   < > 5.5*  5.5*  --  4.9* 4.7* 4.1 4.0    < > = values in this interval not displayed.       ABGs:   Recent Labs   Lab 07/30/19  1325   PH 7.179*   PCO2 35.3   HCO3 13.1*   POCSATURATED 90*   BE -15     CMP:   Recent Labs   Lab 07/28/19  1601  07/29/19  0410  07/29/19  2139 07/30/19  0335 07/30/19  1210      < > 135*  135*   < > 133* 133*  133* 133*   K 4.8   < > 4.3  4.3   < > 4.1 4.0  4.0 3.9   CL 98   < > 100  100   < > 96 95  95 93*   CO2 11*   < > 11*  11*   < > 15* 14*  14* 12*   GLU 90   < > 82  82   < > 124* 117*  117* 87   BUN 29*   < > 28*  28*   < > 21 20  20 18   CREATININE 3.9*   < > 3.4*  3.4*   < > 2.5* 2.3*  2.3* 2.2*   CALCIUM 8.2*   < > 7.9*  7.9*   < > 7.8* 7.9*  7.9* 8.1*   PROT 7.6  --  7.7  --   --  7.6  --    ALBUMIN 2.9*   < > 3.0*  3.0*   < > 2.8* 3.0*  3.0* 2.8*   BILITOT 3.9*  --  5.4*  --   --  7.3*  --    ALKPHOS 134  --  146*  --   --  161*  --    AST 4,507*  --  4,122*  --   --  2,902*  --    ALT 1,790*  --  1,811*  --   --  1,534*  --    ANIONGAP 30*   < > 24*  24*   < > 22* 24*  24* 28*   EGFRNONAA 15*   < > 18*  18*   < > 27* 29*  29* 31*    < > = values in this interval not displayed.     Lactic Acid:   Recent Labs   Lab 07/29/19 2024 07/30/19  0335 07/30/19  0813   LACTATE 10.9* >12.0* >12.0*     All pertinent labs within the past 24 hours have been reviewed.    Significant Imaging:  I have reviewed and interpreted all pertinent imaging results/findings within the past 24 hours.

## 2019-07-30 NOTE — ASSESSMENT & PLAN NOTE
Likely due to severe sepsis and shock.  Continue replace blood products as recommended by hematology.  No evidence of her bleeding at this time.

## 2019-07-30 NOTE — SUBJECTIVE & OBJECTIVE
Interval History:  No significant clinical improvement with no purposeful movements despite holding all sedation.  Minimally responsive to painful stimuli.  Continues require higher doses of vasopressors with persistent acidosis despite continuous renal replacement therapy.    Review of Systems   Unable to perform ROS: Intubated     Objective:     Vital Signs (Most Recent):  Temp: 96.8 °F (36 °C) (07/30/19 1200)  Pulse: (!) 128 (07/30/19 1200)  Resp: 15 (07/30/19 1200)  BP: 105/62 (07/30/19 1200)  SpO2: (!) 92 % (07/30/19 1200) Vital Signs (24h Range):  Temp:  [93.9 °F (34.4 °C)-97.2 °F (36.2 °C)] 96.8 °F (36 °C)  Pulse:  [] 128  Resp:  [13-32] 15  SpO2:  [75 %-100 %] 92 %  BP: ()/(35-74) 105/62  Arterial Line BP: ()/(34-48) 142/44     Weight: 115 kg (253 lb 8.5 oz)  Body mass index is 33.45 kg/m².    Intake/Output Summary (Last 24 hours) at 7/30/2019 1228  Last data filed at 7/30/2019 1115  Gross per 24 hour   Intake 6591.42 ml   Output 8763 ml   Net -2171.58 ml      Physical Exam   HENT:   Head: Normocephalic and atraumatic.   Endotracheal tube and orogastric tube in place.  Poor dentition.   Eyes: Pupils are equal, round, and reactive to light.   Neck: Normal range of motion. Neck supple.   Right chest wall central line in place.   Cardiovascular:   Tachycardic, no murmurs appreciated   Pulmonary/Chest: Effort normal and breath sounds normal. No respiratory distress.   Abdominal: Soft. He exhibits no distension. There is no tenderness.   Non-distended, non-tender, no apparent bowel sounds.   Musculoskeletal: He exhibits edema.   Neurological:   Abnormal involuntary head and neck movements from side to side.  Eyes open reactive to light but no tracking.  Minimal response to painful stimuli.   Skin: Skin is warm and dry.   Stage I sacral breakdown, stage II ulcer to left shin.  Cyanotic digits in his hands and feet bilaterally.   Vitals reviewed.      Significant Labs: All pertinent labs within  the past 24 hours have been reviewed.    Significant Imaging: I have reviewed all pertinent imaging results/findings within the past 24 hours.

## 2019-07-30 NOTE — ASSESSMENT & PLAN NOTE
Unclear if this is septic shock due to Enterococcal bacteremia or cardiogenic after cardiac arrest or combination of both.  Presumed infected central line removed and once he is Hospital followed by line holiday with placement of new dialysis line.  Repeat cultures have been negative.  Continue intravenous antibiotics as per Infectious Disease (currently on intravenous ampicillin and ceftriaxone).  Also treating with intravenous hydrocortisone for possible adrenal insufficiency.  However despite aggressive measures with continuous dialysis, intravenous antibiotics, and vasopressor support, patient has not made significant clinical improvement to this point.  Suspect unlikely patient with make a meaningful recovery but will continue with current aggressive measures for now.  Discussed with Pulmonary Critical Care.

## 2019-07-30 NOTE — ASSESSMENT & PLAN NOTE
Looking at care everywhere, e. Faecalis grew at [facility]. Was pan-sensitive (not VRE)  - continue vanc  - repeat blood cultures so far negative

## 2019-07-30 NOTE — PROGRESS NOTES
0730:  Patient received on Draeger vent with settings as documented.  All alarms on and audible.  Ambu and mask at bedside.  Vent plugged into red outlet with wheels locked.  Patient responds to pain.  Oral care done at this time.  Suctioned scant amount tan/white thin secretions.  ETT moved to left side at this time and secure @ 24CM.  ABG done at this time and reported to Korey Mcneill.  Will continue to monitor.

## 2019-07-30 NOTE — PROGRESS NOTES
Per Tamica in blood bank, no type B platelets available at [facility]. Per Tamica, an adult pt can received a different platelet type 2X in 24 hrs. This is the first for this 24 hr period. Blood bank states that if 2nd unit needs to be administered, it will also have to be type A.

## 2019-07-30 NOTE — ASSESSMENT & PLAN NOTE
Unresponsive after rewarming w/ therapeutic hypothermia protocol.  Concerning for significant anoxic injury despite short duration of code. His severe metabolic acidosis could also be playing a factor and has high ammonium.  - EEG not showing any seizures  - treating ammonium with lactulose  - trying to correct metabolic acidosis with CRRT and pressors

## 2019-07-30 NOTE — PROGRESS NOTES
[facility]  Pulmonology  Progress Note    Patient Name: Virgil Barker  MRN: [id number]  Admission Date: 7/28/2019  Hospital Length of Stay: 2 days  Code Status: DNR  Attending Provider: Brian Romero MD  Primary Care Provider: Primary Doctor No   Principal Problem: Shock    Subjective:     Interval History: Pt severely sick. Overnight he was given 1 U PRBC and 1U of platelets. Has been off all sedatives and still not waking up to follow commands. He does move spontaneously, has reflexes and pupils are reactive so he's not brain dead. In intubated and remains on double pressers. Unchanged from yesterday.    Objective:     Vital Signs (Most Recent):  Temp: 97.2 °F (36.2 °C) (07/30/19 1334)  Pulse: (!) 114 (07/30/19 1334)  Resp: 19 (07/30/19 1334)  BP: 105/62 (07/30/19 1200)  SpO2: (!) 86 % (07/30/19 1334) Vital Signs (24h Range):  Temp:  [94.1 °F (34.5 °C)-97.5 °F (36.4 °C)] 97.2 °F (36.2 °C)  Pulse:  [] 114  Resp:  [13-32] 19  SpO2:  [75 %-100 %] 86 %  BP: ()/(35-74) 105/62  Arterial Line BP: ()/(34-48) 124/38     Weight: 115 kg (253 lb 8.5 oz)  Body mass index is 33.45 kg/m².      Intake/Output Summary (Last 24 hours) at 7/30/2019 1347  Last data filed at 7/30/2019 1323  Gross per 24 hour   Intake 6979.2 ml   Output 8805 ml   Net -1825.8 ml       Physical Exam   Constitutional: He appears well-nourished.   Sick appearing male   HENT:   Head: Normocephalic and atraumatic.   Right Ear: External ear normal.   Left Ear: External ear normal.   Eyes: Pupils are equal, round, and reactive to light. Conjunctivae are normal. Right eye exhibits no discharge. Left eye exhibits no discharge.   Neck: Neck supple. No thyromegaly present.   Cardiovascular: Normal rate and regular rhythm. Exam reveals no friction rub.   No murmur heard.  Pulmonary/Chest: Effort normal. No stridor. No respiratory distress. He has no wheezes. He has no rales.   Abdominal: Soft. He exhibits distension.    Significant amount of ascites   Musculoskeletal: He exhibits edema.   Neurological:   Doesn't follow commands. Moves extremities spontaneously. Withdraws to pain. Pupils reactive to light.   Skin: Capillary refill takes less than 2 seconds. He is not diaphoretic.   Mottled skin of distal extremities.       Vents:  Vent Mode: VC-AC (07/30/19 1334)  Ventilator Initiated: Yes (07/28/19 1451)  Set Rate: 26 bmp (07/30/19 1334)  Vt Set: 450 mL (07/30/19 1132)  PEEP/CPAP: 5 cmH20 (07/30/19 1334)  Oxygen Concentration (%): 35 (07/30/19 1334)  Peak Airway Pressure: 8.9 cmH2O (07/30/19 1334)  Total Ve: 14.2 mL (07/30/19 1334)  F/VT Ratio<105 (RSBI): (!) 24.8 (07/30/19 1334)    Lines/Drains/Airways     Central Venous Catheter Line                 Hemodialysis Catheter 07/23/19 right subclavian 7 days         Percutaneous Central Line Insertion/Assessment - triple lumen  07/23/19 right femoral vein;right femoral 7 days          Drain                 NG/OG Tube orogastric Center mouth -- days         Hemodialysis AV Fistula 06/20/19 Left upper arm 40 days          Airway                 Airway - Non-Surgical 07/28/19 Endotracheal Tube 2 days          Arterial Line                 Arterial Line 07/23/19 Right Radial 7 days                Significant Labs:    CBC/Anemia Profile:  Recent Labs   Lab 07/29/19  1000 07/29/19  1151 07/29/19  2024 07/30/19  0257 07/30/19  0335   WBC  --  9.07 8.83  --  9.40   HGB  --  7.4* 6.2*  --  8.1*   HCT  --  24.6* 21.5*  --  26.9*   PLT  --  24* 18*  --  46*   MCV  --  109* 114*  --  110*   RDW  --  21.6* 22.5*  --  21.8*   FOLATE 7.6  --   --   --   --    LZSNVSXQ18 >2000*  --   --   --   --    OCCULTBLOOD  --   --   --  Positive*  --         Chemistries:  Recent Labs   Lab 07/28/19  1601  07/29/19  0410  07/29/19  1420 07/29/19  2139 07/30/19  0335 07/30/19  1210      < > 135*  135*  --  135* 133* 133*  133* 133*   K 4.8   < > 4.3  4.3  --  4.1 4.1 4.0  4.0 3.9   CL 98   < > 100   100  --  97 96 95  95 93*   CO2 11*   < > 11*  11*  --  17* 15* 14*  14* 12*   BUN 29*   < > 28*  28*  --  24* 21 20  20 18   CREATININE 3.9*   < > 3.4*  3.4*  --  2.8* 2.5* 2.3*  2.3* 2.2*   CALCIUM 8.2*   < > 7.9*  7.9*  --  7.8* 7.8* 7.9*  7.9* 8.1*   ALBUMIN 2.9*   < > 3.0*  3.0*  --  2.9* 2.8* 3.0*  3.0* 2.8*   PROT 7.6  --  7.7  --   --   --  7.6  --    BILITOT 3.9*  --  5.4*  --   --   --  7.3*  --    ALKPHOS 134  --  146*  --   --   --  161*  --    ALT 1,790*  --  1,811*  --   --   --  1,534*  --    AST 4,507*  --  4,122*  --   --   --  2,902*  --    MG 1.9   < > 1.7   < > 2.0  --  1.9 2.3   PHOS 7.5*   < > 5.5*  5.5*  --  4.9* 4.7* 4.1 4.0    < > = values in this interval not displayed.       ABGs:   Recent Labs   Lab 07/30/19  1325   PH 7.179*   PCO2 35.3   HCO3 13.1*   POCSATURATED 90*   BE -15     CMP:   Recent Labs   Lab 07/28/19  1601  07/29/19  0410  07/29/19  2139 07/30/19  0335 07/30/19  1210      < > 135*  135*   < > 133* 133*  133* 133*   K 4.8   < > 4.3  4.3   < > 4.1 4.0  4.0 3.9   CL 98   < > 100  100   < > 96 95  95 93*   CO2 11*   < > 11*  11*   < > 15* 14*  14* 12*   GLU 90   < > 82  82   < > 124* 117*  117* 87   BUN 29*   < > 28*  28*   < > 21 20  20 18   CREATININE 3.9*   < > 3.4*  3.4*   < > 2.5* 2.3*  2.3* 2.2*   CALCIUM 8.2*   < > 7.9*  7.9*   < > 7.8* 7.9*  7.9* 8.1*   PROT 7.6  --  7.7  --   --  7.6  --    ALBUMIN 2.9*   < > 3.0*  3.0*   < > 2.8* 3.0*  3.0* 2.8*   BILITOT 3.9*  --  5.4*  --   --  7.3*  --    ALKPHOS 134  --  146*  --   --  161*  --    AST 4,507*  --  4,122*  --   --  2,902*  --    ALT 1,790*  --  1,811*  --   --  1,534*  --    ANIONGAP 30*   < > 24*  24*   < > 22* 24*  24* 28*   EGFRNONAA 15*   < > 18*  18*   < > 27* 29*  29* 31*    < > = values in this interval not displayed.     Lactic Acid:   Recent Labs   Lab 07/29/19 2024 07/30/19 0335 07/30/19  0813   LACTATE 10.9* >12.0* >12.0*     All pertinent labs within the past 24  hours have been reviewed.    Significant Imaging:  I have reviewed and interpreted all pertinent imaging results/findings within the past 24 hours.    Assessment/Plan:     * Shock  Pt in multiorgan failure. Suspect combination of septic shock and cardiogenic shock (mottled skin). Pt had Enterobacter Faecalis grow at [facility] (but is sensitive to vanc and has been on vanc). EF from 7/24/19 showed EF 25% (previously 40-45%). This could be stunned myocardium s/p cardiac arrest. He also presented with GIB but his Hgb at OSH has been relatively stable (so not hemorrhagic shock).  - On Levo/Vaso. Goal of SBP >90.  - currently on stress dose steroids (hydrocortisone 100mg q8h)  - continue volume removal with CRRT  - Lactate remains > 12. Hasn't changed at all. Can space out how often we do blood draws on this.  - continue vanc/cefepime      Shock liver with possible cirrhosis  Liver US done at [facility] showed cirrhosis and ascites. Unsure why he has cirrhosis (wife states wasn't a drinker, prior hepatitis panels were neg).   - will remove fluid with crrt to help with his portal htn.  - small volume paracentesis didn't show any SBP    Multifactorial encephalopathy  Unresponsive after rewarming w/ therapeutic hypothermia protocol.  Concerning for significant anoxic injury despite short duration of code. His severe metabolic acidosis could also be playing a factor and has high ammonium.  - EEG not showing any seizures  - treating ammonium with lactulose  - trying to correct metabolic acidosis with CRRT and pressors      Acute on chronic systolic congestive heart failure  Pt in decompensating heart failure. Echo from 7/24/19 showed severe pulmonary HTN (PASP 75), EF 25-30%, decreased RV function. Decreased EF could be from stunned myocardium. BNP >4900. Pt with anasarca, pulmonary edema.  - pt needs agressive volume removal however due to hemodynamics, will attempt to pull off slowly and increase as  tolerating  - will continue levo/vaso  - please DO NOT give extra fluids if pt becomes hypotensive. Go up with pressers instead.  - echo report back that is essentially normal. I really don't understand how that is possible. I question whether the echo report here is on a different patient given that echo from last week showed severe pulm HTN, moderate TR and our echo doesn't.    Acute on chronic respiratory failure with hypoxia  Intubated after his cardiac arrest - remains intubated. CXR showing pulmonary edema. Pt desaturates when laid flat due to pulmonary edema.  - Continue LPV @ 6cc/kg with goal Pplat < 30  - Hold all sedation for now  - will pull fluids off with CRRT    End stage renal disease  Pt with severe metabolic acidosis due to lactic acidosis.   - Nephrology following  - on crrt. Will continue to pull fluid off today  - Electrolyte management per Renal  - on bicarb gtt  - ok to space out checking abg to q8h    Cardiac arrest  Reportedly in the context of hypoxemia?  Aspiration? Anesthesia related?  Code reportedly short in duration (about 4-5 min) and may not have lost a pulse.  S/p therapeutic hypothermia.  Neurologic status poor.  - Neuro checks  - Continue vasopressors as above    Enterococcal bacteremia  Looking at care everywhere, e. Faecalis grew at [facility]. Was pan-sensitive (not VRE)  - continue vanc  - repeat blood cultures so far negative           Gilbert Rivers MD  Pulmonology Fellow  [facility], [place], operated by [facility]

## 2019-07-30 NOTE — PLAN OF CARE
Problem: Adult Inpatient Plan of Care  Goal: Plan of Care Review  Outcome: Ongoing (interventions implemented as appropriate)  0845: OG residuals, 600ml. TFs held.   1445: GI consult called.   1630: 1 unit of platelets transfused.

## 2019-07-31 NOTE — NURSING
MD informed of limited access regarding platelet administration.  All three lumens of TLC used for pressors and bicarb drip with none being compatible.  RN unable to get PIV.  Bicarb gtt to be paused to transfuse platelets per MD order.

## 2019-07-31 NOTE — PLAN OF CARE
Problem: Adult Inpatient Plan of Care  Goal: Plan of Care Review  Outcome: Ongoing (interventions implemented as appropriate)  Patient received orally intubated on charted settings.  All alarms are set and audible.  Ambu bag and mask @ HOB.

## 2019-07-31 NOTE — CONSULTS
Consult Note  Gastroenterology    Consult Requested By: Dr Romero  Reason for Consult: GI Bleed    SUBJECTIVE:     History of Present Illness:  Patient is a 63 y.o. male who presents with transferred from, [facility], Pt had EGD done which showed a Dieulafoy lesion and a Gu, biopsy negative for h Pylori Onset of symptoms was gradual starting 2 weeks ago with gradually worsening course since that time. Patient is on a ventilator and pressors , discussed with pulmonary fellow and Nurse no obvious GI bleed reported Past history includes PUD. Previous studies include upper endoscopy.     Review of patient's allergies indicates:  No Known Allergies    Scheduled Meds:   ampicillin IVPB  2 g Intravenous Q6H    cefTRIAXone (ROCEPHIN) IVPB  2 g Intravenous Q24H    hydrocortisone sodium succinate  100 mg Intravenous Q8H    lactulose  20 g Per OG tube TID    pantoprazole  40 mg Intravenous BID    paricalcitol  2 mcg Intravenous Q48H       Continuous Infusions:   dexmedetomidine (PRECEDEX) infusion      custom IV infusion builder 150 mL/hr at 07/31/19 0536    norepinephrine bitartrate-D5W 2.1 mcg/kg/min (07/31/19 0857)    phenylephrine Stopped (07/28/19 2150)    vasopressin (PITRESSIN) infusion 0.04 Units/min (07/31/19 0615)       PRN Meds:.  sodium chloride, sodium chloride, sodium chloride, sodium chloride, sodium chloride, dextrose 50%, dextrose 50%, glucagon (human recombinant), glucose, glucose, insulin aspart U-100, magnesium sulfate IVPB, ondansetron, sodium chloride 0.9%, sodium phosphate IVPB    Past Medical History:   Diagnosis Date    Atrial fibrillation     CAD in native artery     Chronic hypoxemic respiratory failure     On 2LPM O2 at home    Chronic systolic CHF (congestive heart failure)     COPD (chronic obstructive pulmonary disease)     Diabetes mellitus     ESRD (end stage renal disease) on dialysis     History of Clostridium difficile colitis 05/2019    Hyperlipidemia        Past  Surgical History:   Procedure Laterality Date    AV FISTULA PLACEMENT      CORONARY ANGIOPLASTY WITH STENT PLACEMENT      HERNIA REPAIR         History reviewed. No pertinent family history.    Social History     Socioeconomic History    Marital status:      Spouse name: Not on file    Number of children: Not on file    Years of education: Not on file    Highest education level: Not on file   Occupational History    Not on file   Social Needs    Financial resource strain: Not on file    Food insecurity:     Worry: Not on file     Inability: Not on file    Transportation needs:     Medical: Not on file     Non-medical: Not on file   Tobacco Use    Smoking status: Former Smoker     Last attempt to quit: 7/28/2004     Years since quitting: 15.0   Substance and Sexual Activity    Alcohol use: Not Currently    Drug use: Never    Sexual activity: Not on file   Lifestyle    Physical activity:     Days per week: Not on file     Minutes per session: Not on file    Stress: Not on file   Relationships    Social connections:     Talks on phone: Not on file     Gets together: Not on file     Attends [employer] service: Not on file     Active member of club or organization: Not on file     Attends meetings of clubs or organizations: Not on file     Relationship status: Not on file   Other Topics Concern    Not on file   Social History Narrative    Not on file       Review of Systems:  Unable to obtain    OBJECTIVE:   Physical Exam:  General: Well developed, well nourished, no apparent distress  Vital Signs Range (Last 24H):  Temp:  [93.9 °F (34.4 °C)-97.5 °F (36.4 °C)]   Pulse:  []   Resp:  [15-32]   BP: ()/(41-67)   SpO2:  [58 %-97 %]   Arterial Line BP: ()/(36-44)   HEENT: Anicteric, PERRLA  CVS: S1, S2, no murmers/rubs  Lungs: CTA-B, normal excursion  Abdomen: Soft, NT, ND, normal BS's  Extremities: No c/c/e, 1+ DP pulses to BLE's  Skin: No rashes/lesions.      Laboratory:    CBC:    Recent Labs   Lab 07/31/19  0406   WBC 14.64*   RBC 2.15*   HGB 7.0*   HCT 24.3*   PLT 47*   *   MCH 32.6*   MCHC 28.8*     CMP:   Recent Labs   Lab 07/31/19 0406   GLU 66*  66*   CALCIUM 7.9*  7.9*   ALBUMIN 2.7*  2.7*   PROT 7.0   *  133*   K 4.0  4.0   CO2 9*  9*   CL 94*  94*   BUN 14  14   CREATININE 1.8*  1.8*   ALKPHOS 177*   ALT 1,227*   AST 2,168*   BILITOT 8.7*     Coagulation:   Recent Labs   Lab 07/31/19 0406   LABPROT 42.3*   INR 3.8*       Recent Results (from the past 336 hour(s))   CBC with Automated Differential    Collection Time: 07/31/19  4:06 AM   Result Value Ref Range    WBC 14.64 (H) 3.90 - 12.70 K/uL    Hemoglobin 7.0 (L) 14.0 - 18.0 g/dL    Hematocrit 24.3 (L) 40.0 - 54.0 %    Platelets 47 (L) 150 - 350 K/uL   CBC with Automated Differential    Collection Time: 07/30/19  8:16 PM   Result Value Ref Range    WBC 12.89 (H) 3.90 - 12.70 K/uL    Hemoglobin 7.4 (L) 14.0 - 18.0 g/dL    Hematocrit 24.4 (L) 40.0 - 54.0 %    Platelets 48 (L) 150 - 350 K/uL   CBC with Automated Differential    Collection Time: 07/30/19 12:10 PM   Result Value Ref Range    WBC 11.15 3.90 - 12.70 K/uL    Hemoglobin 7.9 (L) 14.0 - 18.0 g/dL    Hematocrit 26.7 (L) 40.0 - 54.0 %    Platelets 28 (LL) 150 - 350 K/uL      Recent Labs   Lab 07/29/19  1000 07/30/19  0335 07/31/19  0406   INR 3.9* 3.5* 3.8*     Recent Labs   Lab 07/29/19  0410  07/30/19  0335 07/30/19  1210 07/30/19  2016 07/31/19  0406   *  135*   < > 133*  133* 133* 133* 133*  133*   K 4.3  4.3   < > 4.0  4.0 3.9 3.8 4.0  4.0     100   < > 95  95 93* 94* 94*  94*   CO2 11*  11*   < > 14*  14* 12* 13* 9*  9*   BUN 28*  28*   < > 20  20 18 16 14  14   CREATININE 3.4*  3.4*   < > 2.3*  2.3* 2.2* 2.0* 1.8*  1.8*   CALCIUM 7.9*  7.9*   < > 7.9*  7.9* 8.1* 8.1* 7.9*  7.9*   PROT 7.7  --  7.6  --   --  7.0   BILITOT 5.4*  --  7.3*  --   --  8.7*   ALKPHOS 146*  --  161*  --   --  177*   ALT 1,811*  --  1,534*   --   --  1,227*   AST 4,122*  --  2,902*  --   --  2,168*    < > = values in this interval not displayed.      Lab Results   Component Value Date    HEPAIGM Negative 07/28/2019    HEPBIGM Negative 07/28/2019    HEPCAB Negative 07/28/2019    No results found for: AFP   No results found for: CEA       Diagnostic Results:  No Further    ASSESSMENT/PLAN:     1. ESRD (end stage renal disease)    2. Chest pain    3. Sepsis    4. Shock    5. Shock, unspecified    6. Anoxic encephalopathy        Plan: 1) PPI  2) Correct thrombocytopenia  3) palliative care consulted  4) Hold off on EGD unless overt gi bleed

## 2019-07-31 NOTE — NURSING
CRRT clotted off at 1600.  MD aware, orders to restart.  CRRT restarted at 1644 without issue.  All alarms active and aubible, will continue to monitor.

## 2019-07-31 NOTE — ASSESSMENT & PLAN NOTE
Septic shock due to Enterococcal bacteremia.  Suspected infected line removed.  Repeat cultures have been negative.  Continue intravenous antibiotics as per Infectious Disease (currently on intravenous ampicillin and ceftriaxone).  Also treating with intravenous hydrocortisone for possible adrenal insufficiency.  However despite aggressive measures with continuous dialysis, intravenous antibiotics, and vasopressor support, patient continues to have evidence of worsening shock requiring higher doses of vasopressors to maintain reasonable mean arterial pressure with worsening refractory acidosis despite both continuous renal replacement therapy and intravenous bicarbonate infusion.  Patient with very poor prognosis.  Will discuss with Pulmonary Critical Care whether anything else can be done to help ameliorate his condition.  Continue with supportive measures for now.

## 2019-07-31 NOTE — PROGRESS NOTES
"Nephrology  Progress Note    Admit Date: 7/28/2019   LOS: 3 days     SUBJECTIVE:     Follow-up For:  Shock, unspecified/ESRD    Interval History:     Remains unresponsive on vent.  Pressors requirements increased and labs worsening despite full support.  Discussed with team.  Continues on CRRT with net volume removal but acidosis worsening.      Review of Systems:   unable.     OBJECTIVE:     Vital Signs Range (Last 24H):  BP (!) 110/56   Pulse 102   Temp 97 °F (36.1 °C) (Axillary)   Resp 17   Ht 6' 1" (1.854 m)   Wt 115 kg (253 lb 8.5 oz)   SpO2 (!) 58%   BMI 33.45 kg/m²     Temp:  [93.9 °F (34.4 °C)-97.5 °F (36.4 °C)]   Pulse:  []   Resp:  [13-32]   BP: ()/(41-67)   SpO2:  [58 %-100 %]   Arterial Line BP: ()/(36-48)     I & O (Last 24H):    Intake/Output Summary (Last 24 hours) at 7/31/2019 0756  Last data filed at 7/31/2019 0610  Gross per 24 hour   Intake 6289.99 ml   Output 7496 ml   Net -1206.01 ml       Physical Exam:  General appearance: critically ill and unresponsive.    Eyes:  Sluggish.  Lungs: vented/few rales  Heart:  AFib tachy-Irregular rate and rhythm, S1, S2 normal, no murmur, rub or kevin.  Abdomen: semi-firm, distended-ascites,  bowel sounds minimal; OGT.  Extremities: cool extremities.  Dep edema.    Skin: cool to touch.   Neurologic: unresponsive.  No gag.   Right IJ SHARRI. LUE AVF with palpable thrill      Laboratory Data:  Recent Labs   Lab 07/31/19  0406   WBC 14.64*   RBC 2.15*   HGB 7.0*   HCT 24.3*   PLT 47*   *   MCH 32.6*   MCHC 28.8*       BMP:   Recent Labs   Lab 07/31/19  0406   GLU 66*  66*   *  133*   K 4.0  4.0   CL 94*  94*   CO2 9*  9*   BUN 14  14   CREATININE 1.8*  1.8*   CALCIUM 7.9*  7.9*   MG 2.2     Lab Results   Component Value Date    CALCIUM 7.9 (L) 07/31/2019    CALCIUM 7.9 (L) 07/31/2019    PHOS 4.1 07/31/2019       Lab Results   Component Value Date    CALCIUM 7.9 (L) 07/31/2019    CALCIUM 7.9 (L) 07/31/2019    CAION 0.89 " (L) 07/30/2019    PHOS 4.1 07/31/2019     Recent Labs   Lab 07/31/19  0406   ALT 1,227*   AST 2,168*   ALKPHOS 177*   BILITOT 8.7*   PROT 7.0   ALBUMIN 2.7*  2.7*       No results found for: URICACID    BNP  Recent Labs   Lab 07/28/19  1601   BNP >4,900*       Medications:  Medication list was reviewed and changes noted under Assessment/Plan.    Diagnostic Results:     This is an abnormal EEG during obtunded state.    Diffuse disorganized slowing of the background is suggestive of a   moderate-to-severe encephalopathy, nonspecific to the cause.  There is no   evidence of an epileptic process on this recording.  No seizures were recorded   during this study.      ASSESSMENT/PLAN:        ESRD on HD with hypertensive heart and acute on chronic systolic HF  - Two Physician emergency consent obtained.    - initiated CRRT for volume removal and metabolic clearance, but unable to pull with degree of shock initially.  -tolerating gentle UF but still on aggressive vasopressors    - Surgeon Dr. Deysi Cantu placed LUE AVF last month.  -Seen on CRRT but seems futile at this juncture.    -replace electrolytes as needed  -Renally dose meds, avoid nephrotoxins, and monitor I/O's closely.    Worsening AGMA/Lactic Acidosis with PH 7.1:  -restarted bicarb drip and CRRT as now.   -worsening despite our efforts.      Septic shock due to Enterococcus bacteremia  - BCx from [facility] reviewed and susceptible to Vanc, Ampicillin, Gent  - Pressors to maintain MAP>60.  -id consult noted     Pulmonary HTN, afib with RVR  - Followed as outpt by Dr. Jose Bernstein.     Cardiac arrest  - Will need Neuro input on brain activity as he has completed hypothermia protocol at [facility].  -EEG reviewed with severe encephalopathy.     Ischemic hepatitis/>NH3  - Due to shock.  -defer    Multifactorial anemia and thrombocytopenia:  -trends noted.   -consumptive vs early DIC.  -Hem eval noted   -transfusions deferred       Dispo:  Dismal prognosis.  Agree with  DNR.  Await family decisions.      Total critical care time (exclusive of procedural time) : 40 minutes  Critical care was necessary to treat or prevent imminent or life-threatening deterioration of the following conditions: Kidney failure  Critical care was time spent personally by me on the following activities: development of treatment plan with patient or surrogate, discussions with consultants, interpretation of cardiac output measurements, examination of patient, ordering and performing treatments and interventions, evaluation of patient's response to treatment, obtaining history from patient or surrogate, ordering and review of laboratory studies, ordering and review of radiographic studies, re-evaluation of patient's condition, pulse oximetry and blood draw for specimens

## 2019-07-31 NOTE — ASSESSMENT & PLAN NOTE
Pt in multiorgan failure. Suspect combination of septic shock and cardiogenic shock (mottled skin). Pt had Enterobacter Faecalis grow at [facility] (but is sensitive to vanc and has been on vanc). EF from 7/24/19 showed EF 25% (previously 40-45%). This could be stunned myocardium s/p cardiac arrest. He also presented with GIB but his Hgb at OSH has been relatively stable (so not hemorrhagic shock).  - Currently on maxed out Levo/Vaso. Adding Epi. Goal of SBP >90.  - currently on stress dose steroids (hydrocortisone 100mg q8h)  - continue volume removal with CRRT  - Lactate remains > 12. Hasn't changed at all.  - continue vanc/cefepime

## 2019-07-31 NOTE — CONSULTS
Palliaitive Care Progress Note:    Discussed POC with Dr. Romero. Poor prognosis, family wants to continue with treatment at present.

## 2019-07-31 NOTE — PROGRESS NOTES
[facility]  Pulmonology  Progress Note    Patient Name: Virgil Barker  MRN: [id number]  Admission Date: 7/28/2019  Hospital Length of Stay: 3 days  Code Status: DNR  Attending Provider: Brian Romero MD  Primary Care Provider: Primary Doctor No   Principal Problem: Shock    Subjective:     Interval History: Pt severely sick. Pupils today are dilated compared to yesterday. Sluggish to light. No gag reflex today. Pt had increasing presser requirements overnight. Now maxed out on 2 pressers (NE and vaso). Bicarb worsening despite crrt and bicarb gtt. Not responding to commands.    Objective:     Vital Signs (Most Recent):  Temp: (!) 93.9 °F (34.4 °C) (07/31/19 1054)  Pulse: 100 (07/31/19 1054)  Resp: (!) 22 (07/31/19 1054)  BP: (!) 93/52 (07/31/19 0902)  SpO2: (!) 89 % (07/31/19 1054) Vital Signs (24h Range):  Temp:  [93.9 °F (34.4 °C)-97.5 °F (36.4 °C)] 93.9 °F (34.4 °C)  Pulse:  [] 100  Resp:  [15-32] 22  SpO2:  [58 %-97 %] 89 %  BP: ()/(41-67) 93/52  Arterial Line BP: ()/(36-44) 86/38     Weight: 115 kg (253 lb 8.5 oz)  Body mass index is 33.45 kg/m².      Intake/Output Summary (Last 24 hours) at 7/31/2019 1152  Last data filed at 7/31/2019 1000  Gross per 24 hour   Intake 6386.98 ml   Output 6388 ml   Net -1.02 ml       Physical Exam   Constitutional: He appears well-nourished.   Sick appearing male   HENT:   Head: Normocephalic and atraumatic.   Right Ear: External ear normal.   Left Ear: External ear normal.   Eyes: Pupils are equal, round, and reactive to light. Conjunctivae are normal. Right eye exhibits no discharge. Left eye exhibits no discharge.   Neck: Neck supple. No thyromegaly present.   Cardiovascular: Normal rate and regular rhythm. Exam reveals no friction rub.   No murmur heard.  Pulmonary/Chest: Effort normal. No stridor. No respiratory distress. He has no wheezes. He has no rales.   Abdominal: Soft. He exhibits distension.   Significant amount of  ascites   Musculoskeletal: He exhibits edema.   Neurological:   Doesn't follow commands. Pupils dilated but sluggish to light. No gag reflex.   Skin: Capillary refill takes less than 2 seconds. He is not diaphoretic.   Mottled skin of distal extremities.       Vents:  Vent Mode: VC-AC (07/31/19 0902)  Ventilator Initiated: Yes (07/28/19 1451)  Set Rate: 26 bmp (07/31/19 0902)  Vt Set: 450 mL (07/31/19 0902)  PEEP/CPAP: 5 cmH20 (07/31/19 0902)  Oxygen Concentration (%): 35 (07/31/19 0902)  Peak Airway Pressure: 26 cmH2O (07/31/19 0902)  Total Ve: 14.6 mL (07/31/19 0902)  F/VT Ratio<105 (RSBI): (!) 34.05 (07/31/19 0902)    Lines/Drains/Airways     Central Venous Catheter Line                 Hemodialysis Catheter 07/23/19 right subclavian 8 days         Percutaneous Central Line Insertion/Assessment - triple lumen  07/23/19 right femoral vein;right femoral 8 days          Drain                 NG/OG Tube orogastric Center mouth -- days         Hemodialysis AV Fistula 06/20/19 Left upper arm 41 days          Airway                 Airway - Non-Surgical 07/28/19 Endotracheal Tube 3 days          Arterial Line                 Arterial Line 07/23/19 Right Radial 8 days                Significant Labs:    CBC/Anemia Profile:  Recent Labs   Lab 07/30/19  0257  07/30/19 1210 07/30/19 2016 07/31/19  0406   WBC  --    < > 11.15 12.89* 14.64*   HGB  --    < > 7.9* 7.4* 7.0*   HCT  --    < > 26.7* 24.4* 24.3*   PLT  --    < > 28* 48* 47*   MCV  --    < > 110* 108* 113*   RDW  --    < > 22.3* 22.3* 22.9*   OCCULTBLOOD Positive*  --   --   --   --     < > = values in this interval not displayed.        Chemistries:  Recent Labs   Lab 07/30/19  0335 07/30/19  1210 07/30/19 2016 07/31/19  0406   *  133* 133* 133* 133*  133*   K 4.0  4.0 3.9 3.8 4.0  4.0   CL 95  95 93* 94* 94*  94*   CO2 14*  14* 12* 13* 9*  9*   BUN 20  20 18 16 14  14   CREATININE 2.3*  2.3* 2.2* 2.0* 1.8*  1.8*   CALCIUM 7.9*  7.9* 8.1* 8.1*  7.9*  7.9*   ALBUMIN 3.0*  3.0* 2.8* 2.8* 2.7*  2.7*   PROT 7.6  --   --  7.0   BILITOT 7.3*  --   --  8.7*   ALKPHOS 161*  --   --  177*   ALT 1,534*  --   --  1,227*   AST 2,902*  --   --  2,168*   MG 1.9 2.3 2.2 2.2   PHOS 4.1 4.0 3.8 4.1       ABGs:   Recent Labs   Lab 07/31/19  0407   PH 7.145*   PCO2 30.4*   HCO3 10.5*   POCSATURATED 95   BE -18     CMP:   Recent Labs   Lab 07/30/19 0335 07/30/19  1210 07/30/19 2016 07/31/19  0406   *  133* 133* 133* 133*  133*   K 4.0  4.0 3.9 3.8 4.0  4.0   CL 95  95 93* 94* 94*  94*   CO2 14*  14* 12* 13* 9*  9*   *  117* 87 89 66*  66*   BUN 20  20 18 16 14  14   CREATININE 2.3*  2.3* 2.2* 2.0* 1.8*  1.8*   CALCIUM 7.9*  7.9* 8.1* 8.1* 7.9*  7.9*   PROT 7.6  --   --  7.0   ALBUMIN 3.0*  3.0* 2.8* 2.8* 2.7*  2.7*   BILITOT 7.3*  --   --  8.7*   ALKPHOS 161*  --   --  177*   AST 2,902*  --   --  2,168*   ALT 1,534*  --   --  1,227*   ANIONGAP 24*  24* 28* 26* 30*  30*   EGFRNONAA 29*  29* 31* 35* 39*  39*     Lactic Acid:   Recent Labs   Lab 07/30/19 0335 07/30/19  0813 07/31/19  0406   LACTATE >12.0* >12.0* >12.0*     All pertinent labs within the past 24 hours have been reviewed.    Significant Imaging:  I have reviewed and interpreted all pertinent imaging results/findings within the past 24 hours.    Assessment/Plan:     * Shock  Pt in multiorgan failure. Suspect combination of septic shock and cardiogenic shock (mottled skin). Pt had Enterobacter Faecalis grow at [facility] (but is sensitive to vanc and has been on vanc). EF from 7/24/19 showed EF 25% (previously 40-45%). This could be stunned myocardium s/p cardiac arrest. He also presented with GIB but his Hgb at OSH has been relatively stable (so not hemorrhagic shock).  - Currently on maxed out Levo/Vaso. Adding Epi. Goal of SBP >90.  - currently on stress dose steroids (hydrocortisone 100mg q8h)  - continue volume removal with CRRT  - Lactate remains > 12. Hasn't  changed at all.  - continue vanc/cefepime      Shock liver with possible cirrhosis  Liver US done at [facility] showed cirrhosis and ascites. Unsure why he has cirrhosis (wife states wasn't a drinker, prior hepatitis panels were neg).   - actively removing fluid with crrt to help with his portal htn.  - small volume paracentesis didn't show any SBP    Multifactorial encephalopathy  Unresponsive after rewarming w/ therapeutic hypothermia protocol.  Concerning for significant anoxic injury despite short duration of code. His severe metabolic acidosis could also be playing a factor and has high ammonium. Todays neuro status changed compared to yesterday. Pt lost gag reflex. Pupils dilated and sluggish to light. Poor neurologic status.  - EEG not showing any seizures  - treating ammonium with lactulose  - trying to correct metabolic acidosis with CRRT and pressors      Acute on chronic systolic congestive heart failure  Pt in decompensating heart failure. Echo from 7/24/19 showed severe pulmonary HTN (PASP 75), EF 25-30%, decreased RV function. Decreased EF could be from stunned myocardium. BNP >4900. Pt with anasarca, pulmonary edema.  - getting adequate volume removal with crrt  - on maxed out levo/vaso. Adding epi.    Acute on chronic respiratory failure with hypoxia  Intubated after his cardiac arrest - remains intubated. CXR showed pulmonary edema. Pt desaturates when laid flat due to pulmonary edema.  - Continue LPV @ 6cc/kg with goal Pplat < 30  - Hold all sedation for now  - will pull fluids off with CRRT    End stage renal disease  Pt with severe metabolic acidosis due to lactic acidosis.   - Nephrology following  - on crrt. Will continue to pull fluid off today  - Electrolyte management per Renal  - bicarb continues to worsen despite being on bicarb gtt    Cardiac arrest  Reportedly in the context of hypoxemia?  Aspiration? Anesthesia related?  Code reportedly short in duration (about 4-5 min) and may  not have lost a pulse.  S/p therapeutic hypothermia.  Neurologic status poor. Pupils dilated and sluggish. No gag reflex. Change in neuro status compared to yesterday.  - Neuro checks  - Continue vasopressors as above    Enterococcal bacteremia  Looking at care everywhere, e. Faecalis grew at [facility]. Was pan-sensitive (not VRE)  - continue vanc  - repeat blood cultures so far negative      Pt with extremely poor prognosis. Family to come to bedside this afternoon. Will continue goals of care talks in person with his wife and son.     Gilbert Rivers MD  Pulmonology Fellow  [facility]

## 2019-07-31 NOTE — ASSESSMENT & PLAN NOTE
Unresponsive after rewarming w/ therapeutic hypothermia protocol.  Concerning for significant anoxic injury despite short duration of code. His severe metabolic acidosis could also be playing a factor and has high ammonium. Todays neuro status changed compared to yesterday. Pt lost gag reflex. Pupils dilated and sluggish to light. Poor neurologic status.  - EEG not showing any seizures  - treating ammonium with lactulose  - trying to correct metabolic acidosis with CRRT and pressors

## 2019-07-31 NOTE — ASSESSMENT & PLAN NOTE
Looking at care everywhere, e. Faecalis grew at [facility]. Was pan-sensitive (not VRE)  - continue vanc  - repeat blood cultures so far negative

## 2019-07-31 NOTE — ASSESSMENT & PLAN NOTE
Echocardiogram on 6/10/18 showed EF 40-45%.  Echocardiogram on 7/23/19 showed EF 25-30% with severe pulmonary hypertension.  Reviewed echocardiogram findings with Dr. Marquise Zafar (Cardiology) who does not feel patient has any significant component of heart failure at this time contributing to his shock.  Continue with medical therapy.

## 2019-07-31 NOTE — ASSESSMENT & PLAN NOTE
Reportedly in the context of hypoxemia?  Aspiration? Anesthesia related?  Code reportedly short in duration (about 4-5 min) and may not have lost a pulse.  S/p therapeutic hypothermia.  Neurologic status poor. Pupils dilated and sluggish. No gag reflex. Change in neuro status compared to yesterday.  - Neuro checks  - Continue vasopressors as above

## 2019-07-31 NOTE — PLAN OF CARE
Wound Care follow up for scrotal blisters, and left lower leg wound and blister site. Assisted by STEPHANIE Goldstein, WTLIT, and RN, Ana. Patient remains on CRRT, non-verbal.     Scrotum - Unroofed blisters with red base, no increase in size. Applied Triad Paste (hydrophilic) per wound care orders.     Left lower leg - dressings still intact, due to change on Saturday.     Due to fragile state of patient did not turn to view sacrum. Daily perineal care addresses this area with Triad paste. No issues noted..    Arabella Pierre RN, Wound and Ostomy

## 2019-07-31 NOTE — PROGRESS NOTES
[facility] Hospital Medicine  Progress Note    Patient Name: Virgil Barker  MRN: [id number]  Patient Class: IP- Inpatient   Admission Date: 7/28/2019  Length of Stay: 3 days  Attending Physician: Brian Romero MD  Primary Care Provider: Primary Doctor No        Subjective:     Principal Problem:Shock      HPI:  Mr. Barker is a 63 year-old man with history of chronic systolic heart failure, chronic obstructive pulmonary disease with chronic hypoxic respiratory failure on home oxygen, coronary artery disease, and end-stage renal disease started on hemodialysis via a tunneled dialysis line about 6 months ago who was admitted to [facility] on 7/17/2019 with generalized weakness and worsening in baseline hemoglobin.  Patient was transfused 3 units of pack red blood cells and he underwent upper endoscopy on 7/19/2019 which revealed a dieulafoy lesion and a small gastric ulcer which was clipped and biopsied.  Biopsy negative for malignancy and negative for H. pylori.    In addition patient was found to be septic secondary to Enterococcus bacteremia due to presumed dialysis line infection.  Patient was treated intravenous antibiotics. Presumed infected line was removed.  Patient went to the operating room for placement of a new dialysis catheter.  Patient suffered cardiac arrest and was resuscitated and required vasopressors.  Patient was transferred to the intensive care unit.  He developed atrial fibrillation rapid response which was treated with amiodarone which was subsequently discontinued due to evidence of liver injury.  While in intensive care unit unit he also received dialysis to treat pulmonary edema.  Patient became progressively more hypotensive and would not tolerate additional traditional hemodialysis and therefore was transferred to [facility] of [facility] unit for initiation of continuous renal replacement therapy.    Overview/Hospital Course:  Patient  transferred to [facility] intensive care unit on 7/28/2019.  Patient presents with evidence of severe shock with multiorgan failure.  Nephrology service consulted and patient started on continuous renal replacement therapy but has had persistent metabolic acidosis with persistent lactic acidosis.  Pulmonary Critical Care also consulted for management of his ventilator and vasopressors.  Patient also on antibiotic treatment directed at Enterococcus as per recommendations by our Infectious Disease specialist Dr. Kimmy Garcia.  Despite these measures and despite withholding holding all sedation he has very poor neurological status with involuntary movements of his head and neck but otherwise no purposeful movement and even limited response to painful stimuli.  Patient also continues to require high doses of multiple vasopressors to maintain reasonable mean arterial pressure.  Patient also with significant thrombocytopenia most likely due to disseminated intravascular coagulation due to shock.  Patient also started on lactulose to treat component of hepatic encephalopathy due to liver failure.  Following meetings with the Critical Care Service and with Palliative Care nurse the decision was to continue with aggressive medical care for now but otherwise to not perform chest compressions in the event of cardiac arrest.  Despite aggressive care patient appears to continue to decline.  Patient with worsening shock with refractory metabolic acidosis despite continuous renal replacement therapy and bicarbonate infusion.    Interval History:  Patient required increased vasopressor support.  Worsening acidosis despite continuous dialysis and bicarbonate infusion.    Review of Systems   Unable to perform ROS: Intubated     Objective:     Vital Signs (Most Recent):  Temp: (!) 93.9 °F (34.4 °C) (07/31/19 0902)  Pulse: 100 (07/31/19 0902)  Resp: 19 (07/31/19 0902)  BP: (!) 93/52 (07/31/19 0902)  SpO2: (!) 85 % (07/31/19  0902) Vital Signs (24h Range):  Temp:  [93.9 °F (34.4 °C)-97.5 °F (36.4 °C)] 93.9 °F (34.4 °C)  Pulse:  [] 100  Resp:  [15-32] 19  SpO2:  [58 %-97 %] 85 %  BP: ()/(41-67) 93/52  Arterial Line BP: ()/(36-44) 98/40     Weight: 115 kg (253 lb 8.5 oz)  Body mass index is 33.45 kg/m².    Intake/Output Summary (Last 24 hours) at 7/31/2019 1009  Last data filed at 7/31/2019 0900  Gross per 24 hour   Intake 5929.48 ml   Output 6528 ml   Net -598.52 ml      Physical Exam   HENT:   Head: Normocephalic and atraumatic.   Endotracheal tube and orogastric tube in place.  Poor dentition.   Eyes: Pupils are equal, round, and reactive to light.   Neck: Normal range of motion. Neck supple.   Right chest wall central line in place.   Cardiovascular:   Tachycardic, no murmurs appreciated   Pulmonary/Chest: Effort normal and breath sounds normal. No respiratory distress.   Abdominal: Soft. He exhibits no distension. There is no tenderness.   Non-distended, non-tender, no apparent bowel sounds.   Musculoskeletal: He exhibits edema.   Neurological:   Despite no sedation patient not responsive to painful stimuli today.  Unable to follow any commands. Eyes open and pupils reactive to light but no tracking.   Skin: Skin is warm and dry.   Stage I sacral breakdown, stage II ulcer to left shin.  Cyanotic digits in his hands and feet bilaterally.   Vitals reviewed.      Significant Labs: All pertinent labs within the past 24 hours have been reviewed.    Significant Imaging: I have reviewed all pertinent imaging results/findings within the past 24 hours.      Assessment/Plan:      * Shock  Septic shock due to Enterococcal bacteremia.  Suspected infected line removed.  Repeat cultures have been negative.  Continue intravenous antibiotics as per Infectious Disease (currently on intravenous ampicillin and ceftriaxone).  Also treating with intravenous hydrocortisone for possible adrenal insufficiency.  However despite aggressive  measures with continuous dialysis, intravenous antibiotics, and vasopressor support, patient continues to have evidence of worsening shock requiring higher doses of vasopressors to maintain reasonable mean arterial pressure with worsening refractory acidosis despite both continuous renal replacement therapy and intravenous bicarbonate infusion.  Patient with very poor prognosis.  Will discuss with Pulmonary Critical Care whether anything else can be done to help ameliorate his condition.  Continue with supportive measures for now.    Enterococcal bacteremia  Repeat blood cultures negative.  Continue with current intravenous antibiotics.    End stage renal disease  Patient with persistent severe lactic acidosis.  Continue with continuous renal replacement therapy along with continuous bicarbonate infusion as per Nephrology.    Acute on chronic systolic congestive heart failure  Echocardiogram on 6/10/18 showed EF 40-45%.  Echocardiogram on 7/23/19 showed EF 25-30% with severe pulmonary hypertension.  Reviewed echocardiogram findings with Dr. Marquise Zafar (Cardiology) who does not feel patient has any significant component of heart failure at this time contributing to his shock.  Continue with medical therapy.    Acute on chronic respiratory failure with hypoxia  Patient with history of chronic obstructive pulmonary disease with chronic respiratory failure on home oxygen and also history of systolic heart failure.  Continue with mechanical ventilation.  Continue with volume removal as tolerated with dialysis to treat pulmonary edema.    Alteration in skin integrity due to moisture  Continue with wound care as per wound care nurse.    Disseminated intravascular coagulation  Likely due to severe sepsis and shock.  Continue replace blood products as recommended by hematology who are currently recommended keeping Fibrinogen > 100 mg/dL and also platelets greater than 50 K/uL.  Will give a dose of cryoprecipitate and  a dose of platelets this morning.    Type 2 diabetes mellitus  Continue with tube feedings and sliding scale insulin as needed.    Malnutrition of mild degree  Continue with tube feedings.    Shock liver with possible cirrhosis  Likely shock liver due to shock but also possibly be hepatic injury related to recent amiodarone use.  Continue with lactulose.    Multifactorial encephalopathy  Prior electroencephalography show no evidence of seizure activity.  Severe encephalopathy likely due to combination of anoxic brain injury and metabolic encephalopathy due to infection, renal failure, and liver failure.  Continue with current treatment however unlikely to make a meaningful recovery.    Cardiac arrest  Continue medical therapy as per Cardiology and Pulmonary Critical Care.    VTE Risk Mitigation (From admission, onward)        Ordered     Place sequential compression device  Until discontinued      07/28/19 1458     IP VTE HIGH RISK PATIENT  Once      07/28/19 1458                Brian Romero MD  Department of Hospital Medicine   [facility]

## 2019-07-31 NOTE — NURSING
Received call from abida in blood bank. There is a delay on transfusion due to no available type b platelet in our hospital and patient already have received 2 different platelet type in 24hrs.

## 2019-07-31 NOTE — SUBJECTIVE & OBJECTIVE
Interval History: family at bedside. maxed on 3 pressors. Had been on CRRT, clotted.     Review of Systems   Unable to perform ROS: Intubated     Objective:     Vital Signs (Most Recent):  Temp: (!) 93.9 °F (34.4 °C) (07/31/19 1550)  Pulse: 96 (07/31/19 1550)  Resp: 20 (07/31/19 1550)  BP: (!) 87/57 (07/31/19 1100)  SpO2: (!) 81 % (07/31/19 1550) Vital Signs (24h Range):  Temp:  [93.6 °F (34.2 °C)-97.3 °F (36.3 °C)] 93.9 °F (34.4 °C)  Pulse:  [] 96  Resp:  [15-32] 20  SpO2:  [58 %-97 %] 81 %  BP: ()/(41-67) 87/57  Arterial Line BP: ()/(36-44) 86/38     Weight: 115 kg (253 lb 8.5 oz)  Body mass index is 33.45 kg/m².    Intake/Output Summary (Last 24 hours) at 7/31/2019 1638  Last data filed at 7/31/2019 1500  Gross per 24 hour   Intake 5222.01 ml   Output 5191 ml   Net 31.01 ml      Physical Exam   Constitutional: He appears well-developed and well-nourished.   HENT:   Head: Normocephalic and atraumatic.   ETT and OGT in place, poor dentition noted    Eyes: Scleral icterus is present.   Neck: Normal range of motion. Neck supple.   R central line, c/d/i   Cardiovascular:   Tachycardic, no murmurs appreciated, right femoral TLC and right chest/neck catheter.    Pulmonary/Chest: Effort normal and breath sounds normal. No respiratory distress.   Abdominal: Soft. He exhibits no distension. There is no tenderness.   NTND; faint fluid wave   Musculoskeletal: He exhibits no edema.   Fistula arm, nontender   Neurological:    no response to voice.    Skin: Skin is warm and dry.   Stage I sacral breakdown, stage II ulcer to left shin    Psychiatric: He has a normal mood and affect. His behavior is normal.   Vitals reviewed.      Significant Labs: All pertinent labs within the past 24 hours have been reviewed.    Significant Imaging: I have reviewed and interpreted all pertinent imaging results/findings within the past 24 hours.

## 2019-07-31 NOTE — ASSESSMENT & PLAN NOTE
Pt with severe metabolic acidosis due to lactic acidosis.   - Nephrology following  - on crrt. Will continue to pull fluid off today  - Electrolyte management per Renal  - bicarb continues to worsen despite being on bicarb gtt

## 2019-07-31 NOTE — PLAN OF CARE
Problem: Spiritual Distress Risk or Actual  Goal: Spiritual Wellbeing    Intervention: Promote Spiritual Wellbeing  Provided f/u visit with grief care, compassionate presence, reflective listening, and prayer upon request by pt's wife and daughter. Large supportive family present.  will continue to follow as needed.

## 2019-07-31 NOTE — CONSULTS
[facility]  Infectious Disease  Consult Note    Patient Name: Virgil Barker  MRN: [id number]  Admission Date: 7/28/2019  Hospital Length of Stay: 3 days  Attending Physician: Brian Romero MD  Primary Care Provider: Primary Doctor No     Isolation Status: No active isolations    Patient information was obtained from patient and ER records.      Consults  Assessment/Plan:     Enterococcal bacteremia  Pt with infected permecath that was removed at OSH and BCx positive from April until now for same organism. 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 Vancomycin alone will not be adequate. Plan for 6 weeks. ascities fluid not c/w sbp. Repeat cultures NGTD. If repeat cultures are positive, would need line exchange again. Could consider JUDY, if c/w goals of care. Please remove femoral line as soon as other access is obtained given increased risk of infection in this location.     Prognosis guarded. maxed out on 3 pressors and is very acidotic and liver failure. Please call ID with any questions/concerns.         Thank you for your consult. I will sign off. Please contact us if you have any additional questions.    Kimmy Garcia MD  Infectious Disease  [facility]    Subjective:     Principal Problem: Shock    HPI: 63M with h/o ESRD transferred from [facility] for management of septic shock. Currently intubated and on CRRT. History obtained by chart (records in CareEverywhere) and wife at bedside    Pt had issues with HD catheter and C.diff in April. BCx 4/22 and 4/25 + E.faecalis. Was treated with metronidazole and IV vancomycin (got one week after discharge). Per wife, catheter was not removed at this time.  Re-admitted 7/17- 7/28 to [facility] with weakness. Was found to be anemic. BCx remained positive for E.faecalis on 7/17 and HD catheter was removed. Reportedly had cardiac arrest during  removal procedure. Wife says there was some hypoxic brain injury. Subsequently had a.fib and was treated with amiodarone which there was concern was contributing to hepatic failure. Underwent EGD during last hospitalization and a gastric ulcer was apparantly clipped. Pt's wife reports he has never had colonoscopy. She denies that he has other indwelling hardware besides permecath and cardiac stent. Did have TTE at OSH on 7/23 that had moderate TR, mild MR, AI and was a technically difficult study.     Pt in acute hepatic failure with meld 40 and elevated ammonia. Hepatitis acute panel negative. Lactate very elevated. Results of EEG pending. TTE pending. On 2 pressors, which are coming down per nursing. ascities was tapped and wbc 123 (38%). curreuntly on vanc/cefepime. ID consulted for antiboitic recommendations.     Interval History: family at bedside. maxed on 3 pressors. Had been on CRRT, clotted.     Review of Systems   Unable to perform ROS: Intubated     Objective:     Vital Signs (Most Recent):  Temp: (!) 93.9 °F (34.4 °C) (07/31/19 1550)  Pulse: 96 (07/31/19 1550)  Resp: 20 (07/31/19 1550)  BP: (!) 87/57 (07/31/19 1100)  SpO2: (!) 81 % (07/31/19 1550) Vital Signs (24h Range):  Temp:  [93.6 °F (34.2 °C)-97.3 °F (36.3 °C)] 93.9 °F (34.4 °C)  Pulse:  [] 96  Resp:  [15-32] 20  SpO2:  [58 %-97 %] 81 %  BP: ()/(41-67) 87/57  Arterial Line BP: ()/(36-44) 86/38     Weight: 115 kg (253 lb 8.5 oz)  Body mass index is 33.45 kg/m².    Intake/Output Summary (Last 24 hours) at 7/31/2019 4698  Last data filed at 7/31/2019 1500  Gross per 24 hour   Intake 5222.01 ml   Output 5191 ml   Net 31.01 ml      Physical Exam   Constitutional: He appears well-developed and well-nourished.   HENT:   Head: Normocephalic and atraumatic.   ETT and OGT in place, poor dentition noted    Eyes: Scleral icterus is present.   Neck: Normal range of motion. Neck supple.   R central line, c/d/i   Cardiovascular:   Tachycardic,  no murmurs appreciated, right femoral TLC and right chest/neck catheter.    Pulmonary/Chest: Effort normal and breath sounds normal. No respiratory distress.   Abdominal: Soft. He exhibits no distension. There is no tenderness.   NTND; faint fluid wave   Musculoskeletal: He exhibits no edema.   Fistula arm, nontender   Neurological:    no response to voice.    Skin: Skin is warm and dry.   Stage I sacral breakdown, stage II ulcer to left shin    Psychiatric: He has a normal mood and affect. His behavior is normal.   Vitals reviewed.      Significant Labs: All pertinent labs within the past 24 hours have been reviewed.    Significant Imaging: I have reviewed and interpreted all pertinent imaging results/findings within the past 24 hours.

## 2019-07-31 NOTE — ASSESSMENT & PLAN NOTE
Pt with infected permecath that was removed at OSH and BCx positive from April until now for same organism. 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 Vancomycin alone will not be adequate. Plan for 6 weeks. ascities fluid not c/w sbp. Repeat cultures NGTD. If repeat cultures are positive, would need line exchange again. Could consider JUDY, if c/w goals of care. Please remove femoral line as soon as other access is obtained given increased risk of infection in this location.     Prognosis guarded. maxed out on 3 pressors and is very acidotic and liver failure. Please call ID with any questions/concerns.

## 2019-07-31 NOTE — SUBJECTIVE & OBJECTIVE
Interval History:  Patient required increased vasopressor support.  Worsening acidosis despite continuous dialysis and bicarbonate infusion.    Review of Systems   Unable to perform ROS: Intubated     Objective:     Vital Signs (Most Recent):  Temp: (!) 93.9 °F (34.4 °C) (07/31/19 0902)  Pulse: 100 (07/31/19 0902)  Resp: 19 (07/31/19 0902)  BP: (!) 93/52 (07/31/19 0902)  SpO2: (!) 85 % (07/31/19 0902) Vital Signs (24h Range):  Temp:  [93.9 °F (34.4 °C)-97.5 °F (36.4 °C)] 93.9 °F (34.4 °C)  Pulse:  [] 100  Resp:  [15-32] 19  SpO2:  [58 %-97 %] 85 %  BP: ()/(41-67) 93/52  Arterial Line BP: ()/(36-44) 98/40     Weight: 115 kg (253 lb 8.5 oz)  Body mass index is 33.45 kg/m².    Intake/Output Summary (Last 24 hours) at 7/31/2019 1009  Last data filed at 7/31/2019 0900  Gross per 24 hour   Intake 5929.48 ml   Output 6528 ml   Net -598.52 ml      Physical Exam   HENT:   Head: Normocephalic and atraumatic.   Endotracheal tube and orogastric tube in place.  Poor dentition.   Eyes: Pupils are equal, round, and reactive to light.   Neck: Normal range of motion. Neck supple.   Right chest wall central line in place.   Cardiovascular:   Tachycardic, no murmurs appreciated   Pulmonary/Chest: Effort normal and breath sounds normal. No respiratory distress.   Abdominal: Soft. He exhibits no distension. There is no tenderness.   Non-distended, non-tender, no apparent bowel sounds.   Musculoskeletal: He exhibits edema.   Neurological:   Despite no sedation patient not responsive to painful stimuli today.  Unable to follow any commands. Eyes open and pupils reactive to light but no tracking.   Skin: Skin is warm and dry.   Stage I sacral breakdown, stage II ulcer to left shin.  Cyanotic digits in his hands and feet bilaterally.   Vitals reviewed.      Significant Labs: All pertinent labs within the past 24 hours have been reviewed.    Significant Imaging: I have reviewed all pertinent imaging results/findings within  the past 24 hours.

## 2019-07-31 NOTE — ASSESSMENT & PLAN NOTE
Liver US done at [facility] showed cirrhosis and ascites. Unsure why he has cirrhosis (wife states wasn't a drinker, prior hepatitis panels were neg).   - actively removing fluid with crrt to help with his portal htn.  - small volume paracentesis didn't show any SBP

## 2019-07-31 NOTE — ASSESSMENT & PLAN NOTE
Pt in decompensating heart failure. Echo from 7/24/19 showed severe pulmonary HTN (PASP 75), EF 25-30%, decreased RV function. Decreased EF could be from stunned myocardium. BNP >4900. Pt with anasarca, pulmonary edema.  - getting adequate volume removal with crrt  - on maxed out levo/vaso. Adding epi.

## 2019-07-31 NOTE — PROGRESS NOTES
Cardiology  Progress Notes            Subjective:  Comatose, on a ventilator, on 2 vasopressors, on CR RT.  Vital Signs:  Vitals:    07/31/19 1054 07/31/19 1100 07/31/19 1209 07/31/19 1350   BP:  (!) 87/57     Pulse: 100 100 102 100   Resp: (!) 22 20 (!) 28 (!) 28   Temp: (!) 93.9 °F (34.4 °C) (!) 93.9 °F (34.4 °C) (!) 93.6 °F (34.2 °C) (!) 93.9 °F (34.4 °C)   TempSrc:       SpO2: (!) 89% (!) 93% (!) 90% (!) 85%   Weight:       Height:           Physical Exam:  Labored breathing  Chest clear  Heart irregularly irregular    Laboratory:  CBC:   Recent Labs   Lab 07/31/19  1120   WBC 15.56*   RBC 1.94*   HGB 6.2*   HCT 22.4*   PLT 37*   *   MCH 32.0*   MCHC 27.7*     BMP:   Recent Labs   Lab 07/31/19  1120   GLU 75   *   K 3.9   CL 94*   CO2 8*   BUN 12   CREATININE 1.6*   CALCIUM 7.6*   MG 2.3     CMP:   Recent Labs   Lab 07/31/19  0406 07/31/19  1120   GLU 66*  66* 75   CALCIUM 7.9*  7.9* 7.6*   ALBUMIN 2.7*  2.7* 2.5*   PROT 7.0  --    *  133* 132*   K 4.0  4.0 3.9   CO2 9*  9* 8*   CL 94*  94* 94*   BUN 14  14 12   CREATININE 1.8*  1.8* 1.6*   ALKPHOS 177*  --    ALT 1,227*  --    AST 2,168*  --    BILITOT 8.7*  --      LFTs:   Recent Labs   Lab 07/31/19  0406 07/31/19  1120   ALT 1,227*  --    AST 2,168*  --    ALKPHOS 177*  --    BILITOT 8.7*  --    PROT 7.0  --    ALBUMIN 2.7*  2.7* 2.5*     Coagulation:   Recent Labs   Lab 07/31/19  0406   INR 3.8*     Cardiac markers: No results for input(s): CKMB, CPKMB, TROPONINT, TROPONINI, MYOGLOBIN in the last 168 hours.    Imaging:  US Lower Extremity Veins Bilateral   Final Result      No evidence of deep venous thrombosis in either lower extremity.  Nonvisualization of the right greater saphenous vein.         Electronically signed by: Angela Dominguez   Date:    07/28/2019   Time:    19:35      X-Ray Chest AP Portable   Final Result      As above.         Electronically signed by: Leobardo Chavarria MD   Date:    07/28/2019   Time:    16:19             Problems:   1. Septic shock, respiratory failure, encephalopathic  2. End-stage renal disease  3. Hypertensive heart disease  4. Atrial fibrillation  5. Shock liver, cirrhosis, ascites.  6. Chronic obstructive lung disease        Plan of Care:  Looks terminal    Discussed with Dr.Teixeira Marquise Zafar

## 2019-07-31 NOTE — NURSING
Received critical value, c02 = 9, lactic acid = >12. Latest H&H 7.0 and 2.15, sharad NP informed. No new orders.

## 2019-07-31 NOTE — ASSESSMENT & PLAN NOTE
Intubated after his cardiac arrest - remains intubated. CXR showed pulmonary edema. Pt desaturates when laid flat due to pulmonary edema.  - Continue LPV @ 6cc/kg with goal Pplat < 30  - Hold all sedation for now  - will pull fluids off with CRRT

## 2019-07-31 NOTE — ASSESSMENT & PLAN NOTE
Prior electroencephalography show no evidence of seizure activity.  Severe encephalopathy likely due to combination of anoxic brain injury and metabolic encephalopathy due to infection, renal failure, and liver failure.  Continue with current treatment however unlikely to make a meaningful recovery.

## 2019-07-31 NOTE — SUBJECTIVE & OBJECTIVE
Interval History: Pt severely sick. Pupils today are dilated compared to yesterday. Sluggish to light. No gag reflex today. Pt had increasing presser requirements overnight. Now maxed out on 2 pressers (NE and vaso). Bicarb worsening despite crrt and bicarb gtt. Not responding to commands.    Objective:     Vital Signs (Most Recent):  Temp: (!) 93.9 °F (34.4 °C) (07/31/19 1054)  Pulse: 100 (07/31/19 1054)  Resp: (!) 22 (07/31/19 1054)  BP: (!) 93/52 (07/31/19 0902)  SpO2: (!) 89 % (07/31/19 1054) Vital Signs (24h Range):  Temp:  [93.9 °F (34.4 °C)-97.5 °F (36.4 °C)] 93.9 °F (34.4 °C)  Pulse:  [] 100  Resp:  [15-32] 22  SpO2:  [58 %-97 %] 89 %  BP: ()/(41-67) 93/52  Arterial Line BP: ()/(36-44) 86/38     Weight: 115 kg (253 lb 8.5 oz)  Body mass index is 33.45 kg/m².      Intake/Output Summary (Last 24 hours) at 7/31/2019 1152  Last data filed at 7/31/2019 1000  Gross per 24 hour   Intake 6386.98 ml   Output 6388 ml   Net -1.02 ml       Physical Exam   Constitutional: He appears well-nourished.   Sick appearing male   HENT:   Head: Normocephalic and atraumatic.   Right Ear: External ear normal.   Left Ear: External ear normal.   Eyes: Pupils are equal, round, and reactive to light. Conjunctivae are normal. Right eye exhibits no discharge. Left eye exhibits no discharge.   Neck: Neck supple. No thyromegaly present.   Cardiovascular: Normal rate and regular rhythm. Exam reveals no friction rub.   No murmur heard.  Pulmonary/Chest: Effort normal. No stridor. No respiratory distress. He has no wheezes. He has no rales.   Abdominal: Soft. He exhibits distension.   Significant amount of ascites   Musculoskeletal: He exhibits edema.   Neurological:   Doesn't follow commands. Pupils dilated but sluggish to light. No gag reflex.   Skin: Capillary refill takes less than 2 seconds. He is not diaphoretic.   Mottled skin of distal extremities.       Vents:  Vent Mode: VC-AC (07/31/19 0902)  Ventilator Initiated:  Yes (07/28/19 1451)  Set Rate: 26 bmp (07/31/19 0902)  Vt Set: 450 mL (07/31/19 0902)  PEEP/CPAP: 5 cmH20 (07/31/19 0902)  Oxygen Concentration (%): 35 (07/31/19 0902)  Peak Airway Pressure: 26 cmH2O (07/31/19 0902)  Total Ve: 14.6 mL (07/31/19 0902)  F/VT Ratio<105 (RSBI): (!) 34.05 (07/31/19 0902)    Lines/Drains/Airways     Central Venous Catheter Line                 Hemodialysis Catheter 07/23/19 right subclavian 8 days         Percutaneous Central Line Insertion/Assessment - triple lumen  07/23/19 right femoral vein;right femoral 8 days          Drain                 NG/OG Tube orogastric Center mouth -- days         Hemodialysis AV Fistula 06/20/19 Left upper arm 41 days          Airway                 Airway - Non-Surgical 07/28/19 Endotracheal Tube 3 days          Arterial Line                 Arterial Line 07/23/19 Right Radial 8 days                Significant Labs:    CBC/Anemia Profile:  Recent Labs   Lab 07/30/19  0257  07/30/19 1210 07/30/19 2016 07/31/19  0406   WBC  --    < > 11.15 12.89* 14.64*   HGB  --    < > 7.9* 7.4* 7.0*   HCT  --    < > 26.7* 24.4* 24.3*   PLT  --    < > 28* 48* 47*   MCV  --    < > 110* 108* 113*   RDW  --    < > 22.3* 22.3* 22.9*   OCCULTBLOOD Positive*  --   --   --   --     < > = values in this interval not displayed.        Chemistries:  Recent Labs   Lab 07/30/19  0335 07/30/19  1210 07/30/19 2016 07/31/19  0406   *  133* 133* 133* 133*  133*   K 4.0  4.0 3.9 3.8 4.0  4.0   CL 95  95 93* 94* 94*  94*   CO2 14*  14* 12* 13* 9*  9*   BUN 20  20 18 16 14  14   CREATININE 2.3*  2.3* 2.2* 2.0* 1.8*  1.8*   CALCIUM 7.9*  7.9* 8.1* 8.1* 7.9*  7.9*   ALBUMIN 3.0*  3.0* 2.8* 2.8* 2.7*  2.7*   PROT 7.6  --   --  7.0   BILITOT 7.3*  --   --  8.7*   ALKPHOS 161*  --   --  177*   ALT 1,534*  --   --  1,227*   AST 2,902*  --   --  2,168*   MG 1.9 2.3 2.2 2.2   PHOS 4.1 4.0 3.8 4.1       ABGs:   Recent Labs   Lab 07/31/19  0407   PH 7.145*   PCO2 30.4*   HCO3  10.5*   POCSATURATED 95   BE -18     CMP:   Recent Labs   Lab 07/30/19  0335 07/30/19  1210 07/30/19  2016 07/31/19  0406   *  133* 133* 133* 133*  133*   K 4.0  4.0 3.9 3.8 4.0  4.0   CL 95  95 93* 94* 94*  94*   CO2 14*  14* 12* 13* 9*  9*   *  117* 87 89 66*  66*   BUN 20  20 18 16 14  14   CREATININE 2.3*  2.3* 2.2* 2.0* 1.8*  1.8*   CALCIUM 7.9*  7.9* 8.1* 8.1* 7.9*  7.9*   PROT 7.6  --   --  7.0   ALBUMIN 3.0*  3.0* 2.8* 2.8* 2.7*  2.7*   BILITOT 7.3*  --   --  8.7*   ALKPHOS 161*  --   --  177*   AST 2,902*  --   --  2,168*   ALT 1,534*  --   --  1,227*   ANIONGAP 24*  24* 28* 26* 30*  30*   EGFRNONAA 29*  29* 31* 35* 39*  39*     Lactic Acid:   Recent Labs   Lab 07/30/19 0335 07/30/19  0813 07/31/19  0406   LACTATE >12.0* >12.0* >12.0*     All pertinent labs within the past 24 hours have been reviewed.    Significant Imaging:  I have reviewed and interpreted all pertinent imaging results/findings within the past 24 hours.

## 2019-07-31 NOTE — ASSESSMENT & PLAN NOTE
Likely due to severe sepsis and shock.  Continue replace blood products as recommended by hematology who are currently recommended keeping Fibrinogen > 100 mg/dL and also platelets greater than 50 K/uL.  Will give a dose of cryoprecipitate and a dose of platelets this morning.

## 2019-08-01 PROBLEM — I50.23 ACUTE ON CHRONIC SYSTOLIC CONGESTIVE HEART FAILURE: Status: RESOLVED | Noted: 2019-01-01 | Resolved: 2019-01-01

## 2019-08-01 NOTE — PLAN OF CARE
Problem: Spiritual Distress Risk or Actual  Goal: Spiritual Wellbeing    Intervention: Promote Spiritual Wellbeing  Fr. Green's re-anoint patient and offered Last Rites this morning.  offered pastoral support with reflective listening, grief care, and compassionate presence.  will continue to follow.

## 2019-08-01 NOTE — PROGRESS NOTES
[facility] Pulmonary/Critical Care Medicine Progress Note 8/1/2019      Virgil Barker   Age: 63 yrs   MRN: [id number] Admit date: 7/28/2019  LOS: 4     SUBJECTIVE:    Overnight events reviewed in chart. Patient seen and examined in the morning. Required multiple units of blood products overnight. Evidence of UGID through OG today, coffee ground emesis. No gag reflex. Unresponsive pupils. No cornea reflex. On 3 pressors.      VITALS:     Temp:  [93.9 °F (34.4 °C)-96.1 °F (35.6 °C)] 94.6 °F (34.8 °C)  Pulse:  [] 74  Resp:  [19-31] 25  SpO2:  [81 %-100 %] 100 %  BP: ()/(26-56) 72/34  Arterial Line BP: (50-92)/(10-36) 50/10       Intake/Output last 3 shifts:     I/O last 3 completed shifts:  In: 31572.7 [I.V.:73277.9; Blood:1725.8; NG/GT:40; IV Piggyback:700]  Out: 4844 [Drains:850; Other:3994]     Intake/Output this shift:     I/O this shift:  In: 30 [NG/GT:30]  Out: -         PHYSICAL EXAM:   Constitutional: Sick appearing.     HEENT:Normocephalic, atraumatic, dilated unresponsive pupils. No gag reflex.     Neck: supple     Resp: Symmetrical, normal expansion, no use of accessory muscles, clear to auscultation bilaterally, No wheezes, rhonchi, or crackles.       Cardio: Regular rate and rhythm, S1 and S2 normal, no murmur, rub or gallop. Anasarca present.     GI: Soft, non-tender. Ascites present.     Extremities: Cyanosis of all four extremities.     Skin: diffuse purpura present on all four extremities with mottling.    Neurologic: Off sedation - no response to pain or voice. No cornea or gag reflex present.        ACTIVE LINES/CATHETERS/TUBES:   R subclavian HD cath  PICC Right femoral  NG/OG  ETT  Arterial line right radial.      VENTILATOR SETTINGS:   Vent Mode: VC-AC  Oxygen Concentration (%):  [40] 40  Resp Rate Total:  [26 br/min-33 br/min] 26 br/min  Vt Set:  [450 mL] 450 mL  PEEP/CPAP:  [5 cmH20] 5 cmH20  Mean Airway Pressure:  [12 cmH20-15 cmH20] 14 cmH20  Recent Labs     07/31/19  0407   PH  7.145*   PCO2 30.4*   PO2 96   HCO3 10.5*   POCSATURATED 95   BE -18        MEDICATIONS:   Scheduled Meds:   hydrocortisone sodium succinate  100 mg Intravenous Q8H    lactulose  20 g Per OG tube TID    paricalcitol  2 mcg Intravenous Q48H     Continuous Infusions:   EPINEPHrine infusion (TITRATING) 2 mcg/kg/min (08/01/19 1130)    norepinephrine bitartrate-D5W 3 mcg/kg/min (08/01/19 1120)    phenylephrine Stopped (07/28/19 2150)    vasopressin (PITRESSIN) infusion 0.04 Units/min (08/01/19 1133)     PRN Meds:sodium chloride, sodium chloride, sodium chloride, sodium chloride, sodium chloride, sodium chloride, sodium chloride, sodium chloride, sodium chloride, sodium chloride, sodium chloride, dextrose 50%, glucose, glucose, magnesium sulfate IVPB, ondansetron, sodium chloride 0.9%, sodium phosphate IVPB     LABORATORY RESULTS:   CBC:     Recent Labs   Lab 07/31/19  1936 08/01/19  0029 08/01/19  0546   WBC 14.89* 18.73* 15.52*   HGB 6.1* 6.5* 6.1*   HCT 21.6* 22.2* 21.9*   PLT 35* 25* 54*        CMP:     Recent Labs     07/30/19  0335  07/31/19  0406 07/31/19  1120 07/31/19  1936 08/01/19  0546   *  133*   < > 133*  133* 132* 132* 130*  130*   CL 95  95   < > 94*  94* 94* 94* 92*  92*   CO2 14*  14*   < > 9*  9* 8* 7* 5*  5*   BUN 20  20   < > 14  14 12 11 11  11   CREATININE 2.3*  2.3*   < > 1.8*  1.8* 1.6* 1.5* 1.8*  1.8*   *  117*   < > 66*  66* 75 95 131*  131*   CALCIUM 7.9*  7.9*   < > 7.9*  7.9* 7.6* 7.2* 6.5*  6.5*   MG 1.9   < > 2.2 2.3 1.9 2.2   PHOS 4.1   < > 4.1 4.1 4.4 6.4*   AST 2,902*  --  2,168*  --   --  1,805*   ALT 1,534*  --  1,227*  --   --  707*   ALKPHOS 161*  --  177*  --   --  164*   BILITOT 7.3*  --  8.7*  --   --  5.9*    < > = values in this interval not displayed.        COAG:     Recent Labs     08/01/19  0028 08/01/19  0546   INR 4.3* 3.4*               Results for orders placed or performed during the hospital encounter of 07/28/19   Blood culture  (site 2)   Result Value Ref Range    Blood Culture, Routine No Growth to date     Blood Culture, Routine No Growth to date     Blood Culture, Routine No Growth to date     Blood Culture, Routine No Growth to date              No results found for this or any previous visit.          No results found for this or any previous visit.          Results for orders placed or performed in visit on 10/01/07   Urine culture   Result Value Ref Range    Urine Culture, Routine NO GROWTH AFTER 48 HOURS.              RADIOLOGY:    No results found in the last 24 hours.     MICROBIOLOGY:   Microbiology Results (last 7 days)     Procedure Component Value Units Date/Time    Blood culture (site 1) [[id number]] Collected:  07/28/19 1600    Order Status:  Completed Specimen:  Blood from Peripheral, Foot, Right Updated:  08/01/19 0612     Blood Culture, Routine No Growth to date      No Growth to date      No Growth to date      No Growth to date    Narrative:       Site # 1, aerobic and anaerobic    Blood culture (site 2) [[id number]] Collected:  07/28/19 1600    Order Status:  Completed Specimen:  Blood from Peripheral, Foot, Right Updated:  08/01/19 0612     Blood Culture, Routine No Growth to date      No Growth to date      No Growth to date      No Growth to date    Narrative:       Site # 2, aerobic only    (rule out SBP) Aerobic culture [[id number]] Collected:  07/28/19 1637    Order Status:  Completed Specimen:  Ascites Updated:  07/31/19 0952     Aerobic Bacterial Culture No growth    Narrative:       To rule out SBP order labs: Aerobic culture [[id number]],  Culture, Anaerobic [[id number]], Gram stain [[id number]], Albumin  [[id number]], Protein [[id number]], LDH [[id number]], WBC \T\ Dff  [[id number]].    (rule out SBP) Culture, Anaerobic [[id number]] Collected:  07/28/19 1637    Order Status:  Completed Specimen:  Ascites Updated:  07/31/19 0924     Anaerobic Culture Culture in progress    Narrative:       To rule out SBP order labs: Aerobic culture  [[id number]],  Culture, Anaerobic [[id number]], Gram stain [[id number]], Albumin  [[id number]], Protein [[id number]], LDH [[id number]], WBC \T\ Dff  [[id number]].           ASSESSMENT / RECOMMENDATIONS:       1. Septic shock  Pt in multiorgan failure. Suspect combination of septic shock and cardiogenic shock (mottled skin). Pt had Enterobacter Faecalis grow at [facility] (but is sensitive to vanc and has been on vanc). EF from 7/24/19 showed EF 25% (previously 40-45%). This could be stunned myocardium s/p cardiac arrest. He also presented with GIB but his Hgb at OSH has been relatively stable (so not hemorrhagic shock).  - Currently on maxed out Levo/Vaso. Adding Epi. Goal of SBP >90.  - currently on stress dose steroids (hydrocortisone 100mg q8h)  - continue volume removal with CRRT  - Lactate remains > 12. Hasn't changed at all.  - continue abx per ID    2. Shock liver  - actively removing fluid with crrt to help with his portal htn.  - small volume paracentesis didn't show any SBP    3. Multifactorial encephalopathy  - EEG not showing any seizures  - treating ammonium with lactulose  - trying to correct metabolic acidosis with CRRT and pressors    4. Acute on chronic systolic CHF  - on maxed out levo/vaso. Added epi.    5. Acute on chronic respiratory failure with hypoxia  - Continue LPV @ 6cc/kg with goal Pplat < 30  - Hold all sedation   - will pull fluids off with CRRT     6. Enterococcal bacteremia    7. Cardiac arrest.   - Neuro checks  - Continue vasopressors as above    8. End stage renal disease.   - Nephrology following  - on crrt. Will continue to pull fluid off today  - Electrolyte management per Renal  - bicarb continues to worsen despite being on bicarb gtt      Bedside meeting with family occurred. They are considering withdrawal of care once more family arrives at bedside.     Case, assessment, and recommendations discussed with attending; changes in care pending clinical course.        Thank you for allowing us to  participate in the care of this patient.     Lalit Smith DO  [facility] Care Fellow  Pager: [phone]     Pt seen and examined with Pulmonary/Critical Care team. Critical Care time was spent validating the history and physical exam, reviewing the lab and imaging results, and discussing the care of the patient with the bedside nurse. The following additional comments are made:    Refractory shock, multiple system organ failure, no cortical responsiveness, no brainstem reflexes. Pt may be brain dead. Even if not, he is moribund and death is imminent. With the permission of Dr Morillo, I explained this to family. They expressed understanding. We will provide palliative care only going forward.  requested. Awaiting additional family.  Critical Care time 65 minutes    Tim Wallace MD  Phone [phone]

## 2019-08-01 NOTE — ASSESSMENT & PLAN NOTE
Patient with worsening hypotension despite maximum doses of three vasopressors.  Patient already with refractory acidosis despite continuous renal replacement therapy and continuous bicarbonate infusion.  Patient also with evidence of worsening disseminated intravascular coagulation.  Patient's blood pressure and refractory acidosis not compatible with life.  I spoke with patient's wife on the phone and updated her on patient's condition.  Discussed with pulmonary/critical care team who also feels unfortunately there is not much more we can offer at this point and that death is likely imminent.

## 2019-08-01 NOTE — PROGRESS NOTES
[facility] Hospital Medicine  Progress Note    Patient Name: Virgil Barker  MRN: [id number]  Patient Class: IP- Inpatient   Admission Date: 7/28/2019  Length of Stay: 4 days  Attending Physician: Brian Romero MD  Primary Care Provider: Primary Doctor No        Subjective:     Principal Problem:Shock      HPI:  Mr. Barker is a 63 year-old man with history of chronic systolic heart failure, chronic obstructive pulmonary disease with chronic hypoxic respiratory failure on home oxygen, coronary artery disease, and end-stage renal disease started on hemodialysis via a tunneled dialysis line about 6 months ago who was admitted to [facility] on 7/17/2019 with generalized weakness and worsening in baseline hemoglobin.  Patient was transfused 3 units of pack red blood cells and he underwent upper endoscopy on 7/19/2019 which revealed a dieulafoy lesion and a small gastric ulcer which was clipped and biopsied.  Biopsy negative for malignancy and negative for H. pylori.    In addition patient was found to be septic secondary to Enterococcus bacteremia due to presumed dialysis line infection.  Patient was treated intravenous antibiotics. Presumed infected line was removed.  Patient went to the operating room for placement of a new dialysis catheter.  Patient suffered cardiac arrest and was resuscitated and required vasopressors.  Patient was transferred to the intensive care unit.  He developed atrial fibrillation rapid response which was treated with amiodarone which was subsequently discontinued due to evidence of liver injury.  While in intensive care unit unit he also received dialysis to treat pulmonary edema.  Patient became progressively more hypotensive and would not tolerate additional traditional hemodialysis and therefore was transferred to [facility] unit for initiation of continuous renal replacement therapy.    Overview/Hospital Course:  Patient  transferred to [facility] intensive care unit on 7/28/2019.  Patient presents with evidence of severe shock with multiorgan failure.  Nephrology service consulted and patient started on continuous renal replacement therapy but has had persistent metabolic acidosis with persistent lactic acidosis.  Pulmonary Critical Care also consulted for management of his ventilator and vasopressors.  Patient also on antibiotic treatment directed at Enterococcus as per recommendations by our Infectious Disease specialist Dr. Kimmy Garcia.  Despite these measures and despite withholding holding all sedation he has very poor neurological status with involuntary movements of his head and neck but otherwise no purposeful movement and even limited response to painful stimuli.  Patient also continues to require high doses of multiple vasopressors to maintain reasonable mean arterial pressure.  Patient also with significant thrombocytopenia most likely due to disseminated intravascular coagulation due to shock.  Patient also started on lactulose to treat component of hepatic encephalopathy due to liver failure.  Following meetings with the Critical Care Service and with Palliative Care nurse the decision was to continue with aggressive medical care for now but otherwise to not perform chest compressions in the event of cardiac arrest.  Despite aggressive care patient appears to continue to decline.  Patient with worsening shock with refractory metabolic acidosis despite continuous renal replacement therapy and bicarbonate infusion.    Interval History:  Patient continues to decline with worsening hypotension/shock despite use of three vasopressors.  Dialysis discontinued last night due to venous access clot.  Neurological exam continues to deteriorate.    Review of Systems   Unable to perform ROS: Intubated     Objective:     Vital Signs (Most Recent):  Temp: (!) 95.7 °F (35.4 °C) (08/01/19 0815)  Pulse: 78 (08/01/19 0815)  Resp:  (!) 26 (08/01/19 0815)  BP: (!) 66/47 (08/01/19 0815)  SpO2: 100 % (08/01/19 0320) Vital Signs (24h Range):  Temp:  [93.6 °F (34.2 °C)-96.1 °F (35.6 °C)] 95.7 °F (35.4 °C)  Pulse:  [] 78  Resp:  [19-31] 26  SpO2:  [81 %-100 %] 100 %  BP: ()/(43-58) 66/47  Arterial Line BP: (60-92)/(22-40) 62/22     Weight: 115 kg (253 lb 8.5 oz)  Body mass index is 33.45 kg/m².    Intake/Output Summary (Last 24 hours) at 8/1/2019 0853  Last data filed at 8/1/2019 0715  Gross per 24 hour   Intake 60260.55 ml   Output 1650 ml   Net 86319.55 ml      Physical Exam   HENT:   Head: Normocephalic and atraumatic.   Endotracheal tube and orogastric tube in place.  Poor dentition.   Eyes: Pupils are equal, round, and reactive to light.   Neck: Normal range of motion. Neck supple.   Right chest wall central line in place.   Cardiovascular:   Tachycardic, no murmurs appreciated   Pulmonary/Chest: Effort normal and breath sounds normal. No respiratory distress.   Abdominal: Soft. He exhibits no distension. There is no tenderness.   Non-distended, non-tender, no apparent bowel sounds.   Musculoskeletal: He exhibits edema.   Neurological:   Despite no sedation patient unresponsive to painful stimuli.  No gap reflex.  Pupils are mildly dilated and not reactive to light now.   Skin: Skin is warm and dry.   Stage I sacral breakdown, stage II ulcer to left shin.  Cyanotic digits in his hands and feet bilaterally. Diffuse skin mottling.   Vitals reviewed.      Significant Labs: All pertinent labs within the past 24 hours have been reviewed.    Significant Imaging: I have reviewed all pertinent imaging results/findings within the past 24 hours.      Assessment/Plan:      * Shock  Patient with worsening hypotension despite maximum doses of three vasopressors.  Patient already with refractory acidosis despite continuous renal replacement therapy and continuous bicarbonate infusion.  Patient also with evidence of worsening disseminated  intravascular coagulation.  Patient's blood pressure and refractory acidosis not compatible with life.  I spoke with patient's wife on the phone and updated her on patient's condition.  Discussed with pulmonary/critical care team who also feels unfortunately there is not much more we can offer at this point and that death is likely imminent.    Enterococcal bacteremia  Repeat blood cultures negative.  However patient continues to having worsening shock despite antibiotics.    End stage renal disease  Patient with persistent severe lactic acidosis.  Continuous renal replacement therapy discontinued as venous line clotted.  Nephrology recommending not attempt to restart dialysis at this time in light of current clinical condition.    Acute on chronic respiratory failure with hypoxia  Continue with mechanical ventilation.    Alteration in skin integrity due to moisture  Continue with wound care.    Disseminated intravascular coagulation  Secondary to severe sepsis and shock.  Despite cryoprecipitate treatment, patient fibrinogen worse this morning consistent with worsening disseminated intravascular coagulation.    Type 2 diabetes mellitus  Continue with tube feedings and sliding scale insulin as needed.    Malnutrition of mild degree  Continue with tube feedings.    Shock liver with possible cirrhosis  Likely shock liver due to shock but also possibly be hepatic injury related to recent amiodarone use.  Continue with lactulose.    Multifactorial encephalopathy  Prior electroencephalography showed no evidence of seizure activity.  Severe encephalopathy likely due to combination of anoxic brain injury and metabolic encephalopathy due to infection, renal failure, and liver failure.    Cardiac arrest  Likely resulted in anoxic brain injury and now with worsening neurological status due to severe encephalopathy due to refractory shock.      VTE Risk Mitigation (From admission, onward)        Ordered     Place sequential  compression device  Until discontinued      07/28/19 1458     IP VTE HIGH RISK PATIENT  Once      07/28/19 1458                Brina Romero MD  Department of Hospital Medicine   [facility]

## 2019-08-01 NOTE — NURSING
Critical Value for Co2 informed to Deepthi HARE. He advised to informed nephrology.    Paged Dr. Bhandari (Nephro) - he ordered to increase sodium bicarb drip to 250 ml/hr. Made aware on the total intake per hour and will transfuse 1 unit of blood through the CRRT machine.

## 2019-08-01 NOTE — DISCHARGE SUMMARY
[facility] Hospital Medicine  Discharge Summary      Patient Name: Virgil Barker  MRN: [id number]  Admission Date: 7/28/2019  Hospital Length of Stay: 4 days  Discharge Date: 8/1/2019  Attending Physician: Brian Romero MD  Discharging Provider: Brian Romero MD      HPI:   Mr. Barker is a 63 year-old man with history of chronic systolic heart failure, chronic obstructive pulmonary disease with chronic hypoxic respiratory failure on home oxygen, coronary artery disease, and end-stage renal disease started on hemodialysis via a tunneled dialysis line about 6 months ago who was admitted to [facility] on 7/17/2019 with generalized weakness and worsening in baseline hemoglobin.  Patient was transfused 3 units of pack red blood cells and he underwent upper endoscopy on 7/19/2019 which revealed a dieulafoy lesion and a small gastric ulcer which was clipped and biopsied.  Biopsy negative for malignancy and negative for H. pylori.    In addition patient was found to be septic secondary to Enterococcus bacteremia due to presumed dialysis line infection.  Patient was treated intravenous antibiotics. Presumed infected line was removed.  Patient went to the operating room for placement of a new dialysis catheter.  Patient suffered cardiac arrest and was resuscitated and required vasopressors.  Patient was transferred to the intensive care unit.  He developed atrial fibrillation rapid response which was treated with amiodarone which was subsequently discontinued due to evidence of liver injury.  While in intensive care unit unit he also received dialysis to treat pulmonary edema.  Patient became progressively more hypotensive and would not tolerate additional traditional hemodialysis and therefore was transferred to [facility] stents of care unit for initiation of continuous renal replacement therapy.    Hospital Course:   Patient transferred to [facility] intensive care  unit on 7/28/2019.  Patient presented with evidence of severe shock with multiorgan failure on continuous norepinephrine infusion.  Patient's neurological exam was poor on arrival here due in part to suspected anoxic brain injury following recent cardiac arrest at outside hospital and also in part due to severe metabolic encephalopathy due to infection, renal failure, and liver failure.      Nephrology service consulted and patient started on continuous renal replacement therapy.  Pulmonary/Critical Care specialists also consulted for management of his critical illness including ventilator management and management of his vasopressors.  Cardiology also consulted to treat possible component of heart failure contributing to his shock however Dr. Marquise Zafar (Cardiology) felt that this was not a significant contributor to his shock.  Patient on antibiotic treatment on arrived and antibiotic treatment adjusted as per recommendations by our Infectious Disease specialist Dr. Kimmy Garcia.  Repeat blood cultures have remained negative.    Despite continuous dialysis patient has had persistent metabolic acidosis with consistently markedly elevated lactic acid levels.  Patient also started on continuous sodium bicarbonate infusion.  Despite these measures patient had refractory severe metabolic acidosis.    Electroencephalography also performed which revealed evidence of severe encephalopathy but otherwise evidence of seizure activity.  Patient had progressive worsening neurological decline with worsening shock requiring higher doses of multiple vasopressors to maintain a reasonable mean arterial pressure.  Patient also developed evidence of disseminated intravascular coagulation complicating his shock.  Patient also treated with lactulose to treat component of hepatic encephalopathy due to liver failure.  Patient received platelets and cryoprecipitate to treat disseminated intravascular coagulation.    Despite  further aggressive medical care patient's condition continued to decline and we reached at point were we were not able to maintain a reasonable mean arterial pressure despite the use of three vasopressors and unable to continue with dialysis any futher.  With input from Pulmonary Critical Care and our other subspecialists, it was decided that further aggressive care would not be able to bring about a meaningful recovery.  Following further discussion with Pulmonary Critical Care the patient's family agreed to withdrawal care.  Patient pronounced dead at 12:26 p.m.  Cause of death is septic shock with multiorgan failure due to Enterococcus bacteremia from suspected hemodialysis line infection.       Consults:   Consults (From admission, onward)        Status Ordering Provider     Inpatient consult to Cardiology  Once     Provider:  Marquise Zafar MD    Completed PIPPA RED     Inpatient consult to Hematology/Oncology  Once     Provider:  Jian Jenkins MD    Completed PIPPA RED     Inpatient consult to Infectious Diseases  Once     Provider:  Kimmy Garcia MD    Completed PIPPA RED     Inpatient consult to Nephrology  Once     Provider:  Yayo Ko MD    Completed PIPPA RED     Inpatient consult to Palliative Care  Once     Provider:  Anali Mccord RN    Completed PIPPA RED     Inpatient consult to Pulmonary Critical Care  Once     Provider:  Trevor June MD    Completed PIPPA RED     Inpatient consult to Registered Dietitian/Nutritionist  Once     Provider:  (Not yet assigned)    Completed PIPPA RED          Final Active Diagnoses:    Diagnosis Date Noted POA    PRINCIPAL PROBLEM:  Shock [R57.9] 07/28/2019 Yes    Enterococcal bacteremia [R78.81, B95.2] 07/28/2019 Yes    End stage renal disease [N18.6] 07/28/2019 Yes    Acute on chronic respiratory failure with hypoxia [J96.21] 07/28/2019 Yes    Alteration in skin integrity due to moisture [R23.9]  2019 Yes    Thrombocytopenia [D69.6] 2019 Yes    Disseminated intravascular coagulation [D65] 2019 Yes    Cardiac arrest [I46.9] 2019 Yes    Multifactorial encephalopathy [G93.1] 2019 Yes    Shock liver with possible cirrhosis [K72.00] 2019 Yes    Malnutrition of mild degree [E44.1] 2019 Yes    Type 2 diabetes mellitus [E11.9] 2019 Yes      Problems Resolved During this Admission:    Diagnosis Date Noted Date Resolved POA    Acute on chronic systolic congestive heart failure [I50.23] 2019 Yes    Goals of care, counseling/discussion [Z71.89] 2019 Not Applicable    Acute blood loss anemia [D62] 2019 Yes    Coronary artery disease involving native coronary artery [I25.10] 2019 Yes    Atrial fibrillation [I48.91] 2019 Yes    Thrombocytopenia [D69.6] 2019 Yes    Debility [R53.81] 2019 Yes       Discharged Condition:     Disposition:        Brian Romero MD  Department of Hospital Medicine  [facility]

## 2019-08-01 NOTE — PLAN OF CARE
Problem: Inability to Wean (Mechanical Ventilation, Invasive)  Goal: Mechanical Ventilation Liberation  Outcome: Ongoing (interventions implemented as appropriate)  Patient received on ventilator with settings as documented. Sat's difficult to obtain but when able, sat's 100%. Patient remains unresponsive. Will continue to monitor.

## 2019-08-01 NOTE — NURSING
[facility]  notified of pt death. Body released to  home by  investigator Buffy Garcia.

## 2019-08-01 NOTE — PROGRESS NOTES
EICU alerted of critical labs     Hb 6.1   - will transfuse 1 PRBC     Plat 34, but no bleeding   - no need of transfusion, just monitor     Bicarb 7, already on Bicarb drip   - asked to inform Nephrology   - will gibe 1 amp Bicarb push      12:26 AM   Coffee ground material from OG tube -- GI bleed   - will change PPI from IV BID to drip   - Hb Q6   - check INR. DIC panel   - will transfuse platelet 1 unit to keep plat > 50 K       2 AM   Hb post transfusion is 6.5   Fibrinogen < 70   Looks like DIC with active GI Bleed   - will give 1 cryoprecipitate & 1 PRBC transfusions

## 2019-08-01 NOTE — ASSESSMENT & PLAN NOTE
Patient with persistent severe lactic acidosis.  Continuous renal replacement therapy discontinued as venous line clotted.  Nephrology recommending not attempt to restart dialysis at this time in light of current clinical condition.

## 2019-08-01 NOTE — ASSESSMENT & PLAN NOTE
Secondary to severe sepsis and shock.  Despite cryoprecipitate treatment, patient fibrinogen worse this morning consistent with worsening disseminated intravascular coagulation.

## 2019-08-01 NOTE — ASSESSMENT & PLAN NOTE
Call from Meryl-  for patient. Asking for a new order for pet therapy for patient. Meryl is asking that the order be back dated to 4/1 and through 10/1. Order needs to be faxed to [phone]. Meryl can be reached if needed at [phone]. Preferred pharmacy not verified.   Repeat blood cultures negative.  However patient continues to having worsening shock despite antibiotics.

## 2019-08-01 NOTE — NURSING
Dr. Bhandari ordered to terminate CRRT if clotted and to continue the every 8 hours lab draw as ordered - no replacement to be given.

## 2019-08-01 NOTE — PLAN OF CARE
Problem: Adult Inpatient Plan of Care  Goal: Plan of Care Review  Outcome: Ongoing (interventions implemented as appropriate)  Transfused 1 unit PRBC and 1 unit cryoprecipitate for Hgb 6.5 and fibrinogen level less than 70.

## 2019-08-01 NOTE — ASSESSMENT & PLAN NOTE
Likely resulted in anoxic brain injury and now with worsening neurological status due to severe encephalopathy due to refractory shock.

## 2019-08-01 NOTE — PROGRESS NOTES
Cardiology  Progress Notes            Subjective:  Comatose on a ventilator on vasopressors on CRRT  Vital Signs:  Vitals:    08/01/19 1215 08/01/19 1219 08/01/19 1220 08/01/19 1226   BP:       Pulse: (!) 52 (!) 0 (!) 0 (!) 0   Resp: (!) 25 (!) 25 (!) 0 (!) 0   Temp: (!) 94.6 °F (34.8 °C) (!) 94.6 °F (34.8 °C) (!) 94.3 °F (34.6 °C) (!) 93.9 °F (34.4 °C)   TempSrc:       SpO2:       Weight:       Height:           Physical Exam:  Chest clear  Heart no gallop  Laboratory:  CBC:   Recent Labs   Lab 08/01/19  1140   WBC 14.41*   RBC 1.39*   HGB 4.3*   HCT 16.3*   PLT 24*   *   MCH 30.9   MCHC 26.4*     BMP:   Recent Labs   Lab 08/01/19  0546   *  131*   *  130*   K 5.2*  5.2*   CL 92*  92*   CO2 5*  5*   BUN 11  11   CREATININE 1.8*  1.8*   CALCIUM 6.5*  6.5*   MG 2.2     CMP:   Recent Labs   Lab 08/01/19  0546   *  131*   CALCIUM 6.5*  6.5*   ALBUMIN 1.6*  1.6*   PROT 4.2*   *  130*   K 5.2*  5.2*   CO2 5*  5*   CL 92*  92*   BUN 11  11   CREATININE 1.8*  1.8*   ALKPHOS 164*   *   AST 1,805*   BILITOT 5.9*     LFTs:   Recent Labs   Lab 08/01/19  0546   *   AST 1,805*   ALKPHOS 164*   BILITOT 5.9*   PROT 4.2*   ALBUMIN 1.6*  1.6*     Coagulation:   Recent Labs   Lab 08/01/19  0546   INR 3.4*     Cardiac markers: No results for input(s): CKMB, CPKMB, TROPONINT, TROPONINI, MYOGLOBIN in the last 168 hours.    Imaging:  US Lower Extremity Veins Bilateral   Final Result      No evidence of deep venous thrombosis in either lower extremity.  Nonvisualization of the right greater saphenous vein.         Electronically signed by: Angela Dominguez   Date:    07/28/2019   Time:    19:35      X-Ray Chest AP Portable   Final Result      As above.         Electronically signed by: Leobardo Chavarria MD   Date:    07/28/2019   Time:    16:19            Problems:   1. Terminal.        Plan of Care:  Await family's visit.          Marquise Zafar

## 2019-08-01 NOTE — PLAN OF CARE
Brief plan of care note-    At around 1225pm patient was noticed to be asystolic on monitor. Family at bedside with the patient.  present.    Examination - no spontaneous movements, no heart sounds, no breaths over the vent. Lack of brainstem reflexes persist (see my exam from earlier today in progress notes.)    Time of death called 12:26 pm. I personally updated the primary team.       Lalit Smith DO  Pulmonary/Critical Care Medicine fellow.    Pt seen and examined with Pulmonary/Critical Care team and this note reviewed and validated with the following additional comments:    Bereavement support provided to family.    Tim Wallace MD  Phone [phone]

## 2019-08-01 NOTE — CONSULTS
"Palliative Care Progress Note:    Contacted Parish Barker [phone] (wife). Wife allowed to verbalize feelings of frustration, sadness and love for .  Wife states that they were hoping for a full recovery but they are trying to come to terms with "God's plan". Discussed importance of self-care during this time. Wife states that family has been brought closer together during this difficult time. Denies further needs at present. Mrs. Barker encouraged to call PC now or in the future for any assistance. Parish thanked staff for kindness and care.   "

## 2019-08-01 NOTE — SUBJECTIVE & OBJECTIVE
Interval History:  Patient continues to decline with worsening hypotension/shock despite use of three vasopressors.  Dialysis discontinued last night due to venous access clot.  Neurological exam continues to deteriorate.    Review of Systems   Unable to perform ROS: Intubated     Objective:     Vital Signs (Most Recent):  Temp: (!) 95.7 °F (35.4 °C) (08/01/19 0815)  Pulse: 78 (08/01/19 0815)  Resp: (!) 26 (08/01/19 0815)  BP: (!) 66/47 (08/01/19 0815)  SpO2: 100 % (08/01/19 0320) Vital Signs (24h Range):  Temp:  [93.6 °F (34.2 °C)-96.1 °F (35.6 °C)] 95.7 °F (35.4 °C)  Pulse:  [] 78  Resp:  [19-31] 26  SpO2:  [81 %-100 %] 100 %  BP: ()/(43-58) 66/47  Arterial Line BP: (60-92)/(22-40) 62/22     Weight: 115 kg (253 lb 8.5 oz)  Body mass index is 33.45 kg/m².    Intake/Output Summary (Last 24 hours) at 8/1/2019 0853  Last data filed at 8/1/2019 0715  Gross per 24 hour   Intake 98807.55 ml   Output 1650 ml   Net 43018.55 ml      Physical Exam   HENT:   Head: Normocephalic and atraumatic.   Endotracheal tube and orogastric tube in place.  Poor dentition.   Eyes: Pupils are equal, round, and reactive to light.   Neck: Normal range of motion. Neck supple.   Right chest wall central line in place.   Cardiovascular:   Tachycardic, no murmurs appreciated   Pulmonary/Chest: Effort normal and breath sounds normal. No respiratory distress.   Abdominal: Soft. He exhibits no distension. There is no tenderness.   Non-distended, non-tender, no apparent bowel sounds.   Musculoskeletal: He exhibits edema.   Neurological:   Despite no sedation patient unresponsive to painful stimuli.  No gap reflex.  Pupils are mildly dilated and not reactive to light now.   Skin: Skin is warm and dry.   Stage I sacral breakdown, stage II ulcer to left shin.  Cyanotic digits in his hands and feet bilaterally. Diffuse skin mottling.   Vitals reviewed.      Significant Labs: All pertinent labs within the past 24 hours have been  reviewed.    Significant Imaging: I have reviewed all pertinent imaging results/findings within the past 24 hours.

## 2019-08-01 NOTE — PROGRESS NOTES
"Gastroenterology Progress Note    Reason for Consult: GI bleed    Subjective:  Remains on ventilator and unresponsive.  High tube feed residuals, so they are being held.  He is maxed on 3 pressors.    ROS:  Unable to obtain - unresponsive    Physical Exam  BP (!) 77/52   Pulse 90   Temp 96.1 °F (35.6 °C)   Resp (!) 26   Ht 6' 1" (1.854 m)   Wt 115 kg (253 lb 8.5 oz)   SpO2 100%   BMI 33.45 kg/m²   Gen: intubated, not on sedation, but not responding  Eyes: + icterus  CV: RRR, delayed capillary refill  Abd: non-distended  Neuro: not responsive to painful stimuli  Skin: mottled appearance to abdomen, extremities    Medications/Infusions:  Current Facility-Administered Medications   Medication Dose Route Frequency Provider Last Rate Last Dose    0.9%  NaCl infusion (for blood administration)   Intravenous Q24H PRN Ida Asif MD        0.9%  NaCl infusion (for blood administration)   Intravenous Q24H PRN Brian Romero MD        0.9%  NaCl infusion (for blood administration)   Intravenous Q24H PRN Ruy Marks NP        0.9%  NaCl infusion (for blood administration)   Intravenous Q24H PRN Ruy Marks NP        0.9%  NaCl infusion (for blood administration)   Intravenous Q24H PRN Brian Romero MD        0.9%  NaCl infusion (for blood administration)   Intravenous Q24H PRN Brian Romero MD        0.9%  NaCl infusion (for blood administration)   Intravenous Q24H PRN Brian Romero MD        0.9%  NaCl infusion (for blood administration)   Intravenous Q24H PRN Moody Garcia MD        0.9%  NaCl infusion (for blood administration)   Intravenous Q24H PRN Moody Garcia MD        0.9%  NaCl infusion (for blood administration)   Intravenous Q24H PRN Moody Garcia MD        0.9%  NaCl infusion (for blood administration)   Intravenous Q24H PRN Moody Garcia MD        ampicillin 2 g in sodium chloride 0.9 % 100 mL IVPB (ready to mix system)  2 g Intravenous Q6H " Kimmy Garcia  mL/hr at 08/01/19 0150 2 g at 08/01/19 0150    cefTRIAXone (ROCEPHIN) 2 g in dextrose 5 % 50 mL IVPB  2 g Intravenous Q24H Kimmy Garcia MD   2 g at 07/31/19 2008    dexmedetomidine (PRECEDEX) 400mcg/100mL 0.9% NaCL infusion  0.2 mcg/kg/hr Intravenous Continuous Jasbir Garsia MD        dextrose 5 % 1,000 mL with sodium bicarbonate 1 mEq/mL (8.4 %) 150 mEq infusion   Intravenous Continuous Mekhi Bhandari Jr.,  mL/hr at 08/01/19 0706      dextrose 50% injection 12.5 g  12.5 g Intravenous PRN Jasbir Garsia MD   12.5 g at 07/31/19 0423    dextrose 50% injection 25 g  25 g Intravenous PRN Jasbir Garsia MD   25 g at 07/29/19 0757    EPINEPHrine (ADRENALIN) 10 mg in sodium chloride 0.9% 250 mL infusion  0.02 mcg/kg/min Intravenous Continuous Ruy Marks  mL/hr at 08/01/19 0758 2 mcg/kg/min at 08/01/19 0758    glucagon (human recombinant) injection 1 mg  1 mg Intramuscular PRN Jasbir Garsia MD        glucose chewable tablet 16 g  16 g Oral PRN Jasbir Garsia MD        glucose chewable tablet 24 g  24 g Oral PRN Jasbir Garsia MD        hydrocortisone sodium succinate injection 100 mg  100 mg Intravenous Q8H Trevor June MD   100 mg at 08/01/19 0539    insulin aspart U-100 pen 0-5 Units  0-5 Units Subcutaneous Q4H PRN Jasbir Garsia MD        lactulose 20 gram/30 mL solution Soln 20 g  20 g Per OG tube TID Jasbir Garsia MD   20 g at 07/31/19 2052    magnesium sulfate 2g in water 50mL IVPB (premix)  2 g Intravenous PRN Korey Mcneill NP   2 g at 07/31/19 2049    norepinephrine 32 mg in dextrose 5 % 250 mL infusion  0.02 mcg/kg/min Intravenous Continuous Gilbert Rivers .1 mL/hr at 08/01/19 0757 3 mcg/kg/min at 08/01/19 0757    ondansetron injection 4 mg  4 mg Intravenous Q8H PRN Jasbir Garsia MD        pantoprazole 40 mg in dextrose 5 % 100 mL infusion (ready to mix system)  8 mg/hr Intravenous Continuous Moody Garcia MD         paricalcitol injection 2 mcg  2 mcg Intravenous Q48H Korey Mcneill NP   2 mcg at 07/30/19 0913    phenylephrine (FELIPE-SYNEPHRINE) 20 mg in sodium chloride 0.9% 250 mL infusion  0.5 mcg/kg/min Intravenous Continuous Rosita Monet PA-C   Stopped at 07/28/19 2150    sodium chloride 0.9% flush 10 mL  10 mL Intravenous PRN Jasbir Garsia MD        sodium phosphate 20.01 mmol in dextrose 5 % 250 mL IVPB  20.01 mmol Intravenous PRN Korey Mcneill NP        vasopressin (PITRESSIN) 1 Units/mL in dextrose 5 % 100 mL infusion  0.04 Units/min Intravenous Continuous Jasbir Garsia MD 2.4 mL/hr at 08/01/19 0552 0.04 Units/min at 08/01/19 0552       Intake and Output:    Intake/Output Summary (Last 24 hours) at 8/1/2019 0758  Last data filed at 8/1/2019 0600  Gross per 24 hour   Intake 43503.55 ml   Output 1892 ml   Net 76446.55 ml       Labs:  Lab Results   Component Value Date/Time    WBC 15.52 (H) 08/01/2019 05:46 AM    HGB 6.1 (L) 08/01/2019 05:46 AM    HCT 21.9 (L) 08/01/2019 05:46 AM    PLT 25 (LL) 08/01/2019 12:29 AM     (H) 08/01/2019 05:46 AM     (L) 08/01/2019 05:46 AM     (L) 08/01/2019 05:46 AM    K 5.2 (H) 08/01/2019 05:46 AM    K 5.2 (H) 08/01/2019 05:46 AM    CL 92 (L) 08/01/2019 05:46 AM    CL 92 (L) 08/01/2019 05:46 AM    CO2 5 (LL) 08/01/2019 05:46 AM    CO2 5 (LL) 08/01/2019 05:46 AM    BUN 11 08/01/2019 05:46 AM    BUN 11 08/01/2019 05:46 AM    CREATININE 1.8 (H) 08/01/2019 05:46 AM    CREATININE 1.8 (H) 08/01/2019 05:46 AM     (H) 08/01/2019 05:46 AM     (H) 08/01/2019 05:46 AM    CALCIUM 6.5 (LL) 08/01/2019 05:46 AM    CALCIUM 6.5 (LL) 08/01/2019 05:46 AM    MG 2.2 08/01/2019 05:46 AM    PHOS 6.4 (H) 08/01/2019 05:46 AM    INR 3.4 (H) 08/01/2019 05:46 AM   ]  Lab Results   Component Value Date/Time    PROT 4.2 (L) 08/01/2019 05:46 AM    ALBUMIN 1.6 (L) 08/01/2019 05:46 AM    ALBUMIN 1.6 (L) 08/01/2019 05:46 AM    BILITOT 5.9 (H) 08/01/2019 05:46 AM    AST  1,805 (H) 08/01/2019 05:46 AM     (H) 08/01/2019 05:46 AM    ALKPHOS 164 (H) 08/01/2019 05:46 AM   ]    Imaging and Procedures:  Labs and imaging results were reviewed.  None new/pertinent    Assessment:  Virgil Barker is a 63 y.o. male with a history of ESRD, possible cirrhosis who was transferred from [facility] for CRRT.  He was admitted there for septic shock due to E. Faecalis.  He suffered cardiac arrest with ROSC.  He is now on 3 pressors, getting CRRT, intubated and unresponsive despite no sedation.  Labs consistent with DIC and shock.    Plan:  - supportive care for now with platelets, cryo, and PRBCs as needed  - pressors, CRRT and vent per primary/critical care/nephrology  - poor prognosis  - no plans for any endoscopy as it is unlikely to change prognosis/outcome    Ashli Vallejo MD

## 2019-08-01 NOTE — PROGRESS NOTES
Asystole noted on monitor at 1219. No pulse palpated upon assessment. Dr Fuentes and Dr Smith notified. Family and  at bedside. MDs at bedside, time of death 1226 per MDs. Dr Romero aware.

## 2019-08-01 NOTE — PROGRESS NOTES
Discussed tube feedings with Dr Romero, OK to hold off completely regardless of residual amounts. Also notified MD of decreasing BP. Family notified per Dr Romero.

## 2019-08-01 NOTE — PROGRESS NOTES
"Nephrology  Progress Note    Admit Date: 7/28/2019   LOS: 4 days     SUBJECTIVE:     Follow-up For:  Shock, unspecified/ESRD    Interval History:     Remains unresponsive on vent.  Pressors requirements increased and labs worsening despite full support.  Discussed with team.  RRT stopped last night due to continued clotting and poor MAP.  Patient's wife at bedside for discussion of POC.      Review of Systems:  Unable to obtain    OBJECTIVE:     Vital Signs Range (Last 24H):  BP (!) 64/45   Pulse 76   Temp (!) 95.7 °F (35.4 °C)   Resp (!) 26   Ht 6' 1" (1.854 m)   Wt 115 kg (253 lb 8.5 oz)   SpO2 100%   BMI 33.45 kg/m²     Temp:  [93.6 °F (34.2 °C)-96.1 °F (35.6 °C)]   Pulse:  []   Resp:  [19-31]   BP: ()/(43-58)   SpO2:  [81 %-100 %]   Arterial Line BP: (60-92)/(22-38)     I & O (Last 24H):    Intake/Output Summary (Last 24 hours) at 8/1/2019 0940  Last data filed at 8/1/2019 0715  Gross per 24 hour   Intake 75205.36 ml   Output 1457 ml   Net 29393.36 ml       Physical Exam:  General appearance: critically ill and unresponsive.    Eyes:  Sluggish.  Lungs: vented/few rales  Heart:  AFib tachy-Irregular rate and rhythm, S1, S2 normal, no murmur, rub or kevin.  Abdomen: semi-firm, distended-ascites,  bowel sounds minimal; OGT.  Extremities: cool extremities.  Dep edema.    Skin: cool to touch.   Neurologic: unresponsive.  No gag.   Right IJ SHARRI. LUE AVF with palpable thrill      Laboratory Data:  Recent Labs   Lab 08/01/19  0546   WBC 15.52*   RBC 1.97*   HGB 6.1*   HCT 21.9*   PLT 54*   *   MCH 31.0   MCHC 27.9*       BMP:   Recent Labs   Lab 08/01/19  0546   *  131*   *  130*   K 5.2*  5.2*   CL 92*  92*   CO2 5*  5*   BUN 11  11   CREATININE 1.8*  1.8*   CALCIUM 6.5*  6.5*   MG 2.2     Lab Results   Component Value Date    CALCIUM 6.5 (LL) 08/01/2019    CALCIUM 6.5 (LL) 08/01/2019    PHOS 6.4 (H) 08/01/2019       Lab Results   Component Value Date    CALCIUM 6.5 (LL) " 08/01/2019    CALCIUM 6.5 (LL) 08/01/2019    CAION 0.89 (L) 07/30/2019    PHOS 6.4 (H) 08/01/2019     Recent Labs   Lab 08/01/19  0546   *   AST 1,805*   ALKPHOS 164*   BILITOT 5.9*   PROT 4.2*   ALBUMIN 1.6*  1.6*       No results found for: URICACID    BNP  Recent Labs   Lab 07/28/19  1601   BNP >4,900*       Medications:  Medication list was reviewed and changes noted under Assessment/Plan.    Diagnostic Results:     This is an abnormal EEG during obtunded state.    Diffuse disorganized slowing of the background is suggestive of a   moderate-to-severe encephalopathy, nonspecific to the cause.  There is no   evidence of an epileptic process on this recording.  No seizures were recorded   during this study.      ASSESSMENT/PLAN:        ESRD on HD with hypertensive heart and acute on chronic systolic HF  - Two Physician emergency consent obtained.    - initiated CRRT for volume removal and metabolic clearance, but unable to UF due to poor MAPs.  -Remains on vasopressors    -CRRT has been held    -replace electrolytes as needed  -Renally dose meds, avoid nephrotoxins, and monitor I/O's closely.    Worsening AGMA/Lactic Acidosis  -restarted bicarb drip but unable to remove volume  -worsening despite our efforts.   -Hyperkalemia due to electrolyte shifts from acidosis     Septic shock due to Enterococcus bacteremia  - BCx from [facility] reviewed and susceptible to Vanc, Ampicillin, Gent  - Pressors to maintain MAP>60.  - ID consult noted     Pulmonary HTN, afib with RVR  - Followed as outpt by Dr. Jose Bernstein.     Cardiac arrest  - Will need Neuro input on brain activity as he has completed hypothermia protocol at [facility].  -EEG reviewed with severe encephalopathy.     Ischemic hepatitis/>NH3  - Due to shock.  -defer    Multifactorial anemia and thrombocytopenia:  -consumptive vs early DIC.  -Hem eval noted   -transfusions deferred     Dispo:  Dismal prognosis.  Agree with DNR.  Await family decisions.      Total  critical care time (exclusive of procedural time) : 40 minutes  Critical care was necessary to treat or prevent imminent or life-threatening deterioration of the following conditions: Kidney failure  Critical care was time spent personally by me on the following activities: development of treatment plan with patient or surrogate, discussions with consultants, interpretation of cardiac output measurements, examination of patient, ordering and performing treatments and interventions, evaluation of patient's response to treatment, obtaining history from patient or surrogate, ordering and review of laboratory studies, ordering and review of radiographic studies, re-evaluation of patient's condition, pulse oximetry and blood draw for specimens

## 2019-08-01 NOTE — ASSESSMENT & PLAN NOTE
Prior electroencephalography showed no evidence of seizure activity.  Severe encephalopathy likely due to combination of anoxic brain injury and metabolic encephalopathy due to infection, renal failure, and liver failure.

## 2019-08-02 LAB
ALBUMIN SERPL BCP-MCNC: 1.6 G/DL (ref 3.5–5.2)
ALBUMIN SERPL BCP-MCNC: 1.6 G/DL (ref 3.5–5.2)
ALP SERPL-CCNC: 164 U/L (ref 55–135)
ALT SERPL W/O P-5'-P-CCNC: 707 U/L (ref 10–44)
ANION GAP SERPL CALC-SCNC: 33 MMOL/L (ref 8–16)
ANION GAP SERPL CALC-SCNC: 33 MMOL/L (ref 8–16)
AST SERPL-CCNC: 1805 U/L (ref 10–40)
BILIRUB SERPL-MCNC: 5.9 MG/DL (ref 0.1–1)
BUN SERPL-MCNC: 11 MG/DL (ref 8–23)
BUN SERPL-MCNC: 11 MG/DL (ref 8–23)
CALCIUM SERPL-MCNC: 6.5 MG/DL (ref 8.7–10.5)
CALCIUM SERPL-MCNC: 6.5 MG/DL (ref 8.7–10.5)
CHLORIDE SERPL-SCNC: 92 MMOL/L (ref 95–110)
CHLORIDE SERPL-SCNC: 92 MMOL/L (ref 95–110)
CO2 SERPL-SCNC: 5 MMOL/L (ref 23–29)
CO2 SERPL-SCNC: 5 MMOL/L (ref 23–29)
CREAT SERPL-MCNC: 1.8 MG/DL (ref 0.5–1.4)
CREAT SERPL-MCNC: 1.8 MG/DL (ref 0.5–1.4)
DEPRECATED SRA 0.1U/ML PORCINE HEPARIN S: 0 %
DEPRECATED SRA 100U/ML PORCINE HEPARIN S: 0 %
EST. GFR  (AFRICAN AMERICAN): 46 ML/MIN/1.73 M^2
EST. GFR  (AFRICAN AMERICAN): 46 ML/MIN/1.73 M^2
EST. GFR  (NON AFRICAN AMERICAN): 39 ML/MIN/1.73 M^2
EST. GFR  (NON AFRICAN AMERICAN): 39 ML/MIN/1.73 M^2
GLUCOSE SERPL-MCNC: 131 MG/DL (ref 70–110)
GLUCOSE SERPL-MCNC: 131 MG/DL (ref 70–110)
PHOSPHATE SERPL-MCNC: 6.4 MG/DL (ref 2.7–4.5)
POTASSIUM SERPL-SCNC: 5.2 MMOL/L (ref 3.5–5.1)
POTASSIUM SERPL-SCNC: 5.2 MMOL/L (ref 3.5–5.1)
PROT SERPL-MCNC: 4.2 G/DL (ref 6–8.4)
SODIUM SERPL-SCNC: 130 MMOL/L (ref 136–145)
SODIUM SERPL-SCNC: 130 MMOL/L (ref 136–145)
SRA PORCINE INTERP SER-IMP: NEGATIVE

## 2019-08-03 LAB
BACTERIA BLD CULT: NORMAL
BACTERIA BLD CULT: NORMAL

## 2019-08-05 LAB — BACTERIA SPEC ANAEROBE CULT: NORMAL
